# Patient Record
Sex: FEMALE | Race: WHITE | NOT HISPANIC OR LATINO | Employment: FULL TIME | URBAN - METROPOLITAN AREA
[De-identification: names, ages, dates, MRNs, and addresses within clinical notes are randomized per-mention and may not be internally consistent; named-entity substitution may affect disease eponyms.]

---

## 2017-03-08 ENCOUNTER — OFFICE VISIT - HEALTHEAST (OUTPATIENT)
Dept: FAMILY MEDICINE | Facility: CLINIC | Age: 32
End: 2017-03-08

## 2017-03-08 ENCOUNTER — COMMUNICATION - HEALTHEAST (OUTPATIENT)
Dept: TELEHEALTH | Facility: CLINIC | Age: 32
End: 2017-03-08

## 2017-03-08 DIAGNOSIS — Z00.00 ROUTINE GENERAL MEDICAL EXAMINATION AT A HEALTH CARE FACILITY: ICD-10-CM

## 2017-03-08 ASSESSMENT — MIFFLIN-ST. JEOR: SCORE: 1481.44

## 2017-03-13 LAB
HPV INTERPRETATION - HISTORICAL: NORMAL
HPV INTERPRETER - HISTORICAL: NORMAL

## 2017-03-14 ENCOUNTER — COMMUNICATION - HEALTHEAST (OUTPATIENT)
Dept: FAMILY MEDICINE | Facility: CLINIC | Age: 32
End: 2017-03-14

## 2017-03-14 LAB
BKR LAB AP ABNORMAL BLEEDING: NO
BKR LAB AP BIRTH CONTROL/HORMONES: NORMAL
BKR LAB AP CERVICAL APPEARANCE: NORMAL
BKR LAB AP GYN ADEQUACY: NORMAL
BKR LAB AP GYN INTERPRETATION: NORMAL
BKR LAB AP HPV REFLEX: NORMAL
BKR LAB AP LMP: NORMAL
BKR LAB AP PATIENT STATUS: NORMAL
BKR LAB AP PREVIOUS ABNORMAL: NORMAL
BKR LAB AP PREVIOUS NORMAL: 2012
HIGH RISK?: NO
PATH REPORT.COMMENTS IMP SPEC: NORMAL
RESULT FLAG (HE HISTORICAL CONVERSION): NORMAL

## 2017-03-15 ENCOUNTER — COMMUNICATION - HEALTHEAST (OUTPATIENT)
Dept: TELEHEALTH | Facility: CLINIC | Age: 32
End: 2017-03-15

## 2017-03-15 ENCOUNTER — AMBULATORY - HEALTHEAST (OUTPATIENT)
Dept: FAMILY MEDICINE | Facility: CLINIC | Age: 32
End: 2017-03-15

## 2017-03-15 ASSESSMENT — MIFFLIN-ST. JEOR: SCORE: 1499.87

## 2017-04-12 ENCOUNTER — OFFICE VISIT - HEALTHEAST (OUTPATIENT)
Dept: FAMILY MEDICINE | Facility: CLINIC | Age: 32
End: 2017-04-12

## 2017-04-12 DIAGNOSIS — Z30.431 IUD CHECK UP: ICD-10-CM

## 2017-04-12 ASSESSMENT — MIFFLIN-ST. JEOR: SCORE: 1517.45

## 2018-01-07 ENCOUNTER — HEALTH MAINTENANCE LETTER (OUTPATIENT)
Age: 33
End: 2018-01-07

## 2018-01-19 ENCOUNTER — OFFICE VISIT - HEALTHEAST (OUTPATIENT)
Dept: FAMILY MEDICINE | Facility: CLINIC | Age: 33
End: 2018-01-19

## 2018-01-19 DIAGNOSIS — L30.9 DERMATITIS: ICD-10-CM

## 2018-01-19 ASSESSMENT — MIFFLIN-ST. JEOR: SCORE: 1518.3

## 2018-01-25 ENCOUNTER — COMMUNICATION - HEALTHEAST (OUTPATIENT)
Dept: FAMILY MEDICINE | Facility: CLINIC | Age: 33
End: 2018-01-25

## 2018-05-16 ENCOUNTER — OFFICE VISIT - HEALTHEAST (OUTPATIENT)
Dept: FAMILY MEDICINE | Facility: CLINIC | Age: 33
End: 2018-05-16

## 2018-05-16 DIAGNOSIS — L30.9 DERMATITIS: ICD-10-CM

## 2018-05-16 ASSESSMENT — MIFFLIN-ST. JEOR: SCORE: 1475.21

## 2019-05-08 ENCOUNTER — OFFICE VISIT - HEALTHEAST (OUTPATIENT)
Dept: FAMILY MEDICINE | Facility: CLINIC | Age: 34
End: 2019-05-08

## 2019-05-08 DIAGNOSIS — B07.8 COMMON WART: ICD-10-CM

## 2019-05-08 DIAGNOSIS — Z30.9 ENCOUNTER FOR CONTRACEPTIVE MANAGEMENT, UNSPECIFIED TYPE: ICD-10-CM

## 2019-05-08 ASSESSMENT — MIFFLIN-ST. JEOR: SCORE: 1472.37

## 2019-07-10 ENCOUNTER — OFFICE VISIT - HEALTHEAST (OUTPATIENT)
Dept: FAMILY MEDICINE | Facility: CLINIC | Age: 34
End: 2019-07-10

## 2019-07-10 DIAGNOSIS — B07.9 VIRAL WARTS, UNSPECIFIED TYPE: ICD-10-CM

## 2019-07-10 ASSESSMENT — MIFFLIN-ST. JEOR: SCORE: 1468.41

## 2019-08-07 ENCOUNTER — COMMUNICATION - HEALTHEAST (OUTPATIENT)
Dept: FAMILY MEDICINE | Facility: CLINIC | Age: 34
End: 2019-08-07

## 2019-08-28 ENCOUNTER — COMMUNICATION - HEALTHEAST (OUTPATIENT)
Dept: FAMILY MEDICINE | Facility: CLINIC | Age: 34
End: 2019-08-28

## 2019-09-21 ENCOUNTER — HOSPITAL ENCOUNTER (EMERGENCY)
Facility: CLINIC | Age: 34
Discharge: HOME | End: 2019-09-21
Attending: FAMILY MEDICINE | Admitting: FAMILY MEDICINE
Payer: COMMERCIAL

## 2019-09-21 VITALS
TEMPERATURE: 101.4 F | HEART RATE: 103 BPM | BODY MASS INDEX: 24.71 KG/M2 | WEIGHT: 163 LBS | DIASTOLIC BLOOD PRESSURE: 71 MMHG | HEIGHT: 68 IN | SYSTOLIC BLOOD PRESSURE: 118 MMHG | OXYGEN SATURATION: 100 %

## 2019-09-21 DIAGNOSIS — R55 SYNCOPE AND COLLAPSE: ICD-10-CM

## 2019-09-21 LAB
ALBUMIN SERPL-MCNC: 4 G/DL (ref 3.4–5)
ALBUMIN UR-MCNC: NEGATIVE MG/DL
ALP SERPL-CCNC: 54 U/L (ref 40–150)
ALT SERPL W P-5'-P-CCNC: 36 U/L (ref 0–50)
ANION GAP SERPL CALCULATED.3IONS-SCNC: 6 MMOL/L (ref 3–14)
APPEARANCE UR: CLEAR
APTT PPP: 25 SEC (ref 22–37)
AST SERPL W P-5'-P-CCNC: 19 U/L (ref 0–45)
BASOPHILS # BLD AUTO: 0 10E9/L (ref 0–0.2)
BASOPHILS NFR BLD AUTO: 0.3 %
BILIRUB SERPL-MCNC: 0.2 MG/DL (ref 0.2–1.3)
BILIRUB UR QL STRIP: NEGATIVE
BUN SERPL-MCNC: 15 MG/DL (ref 7–30)
CALCIUM SERPL-MCNC: 8.8 MG/DL (ref 8.5–10.1)
CHLORIDE SERPL-SCNC: 101 MMOL/L (ref 94–109)
CO2 SERPL-SCNC: 27 MMOL/L (ref 20–32)
COLOR UR AUTO: YELLOW
CREAT SERPL-MCNC: 0.9 MG/DL (ref 0.52–1.04)
CRP SERPL-MCNC: 5.3 MG/L (ref 0–8)
DEPRECATED S PYO AG THROAT QL EIA: NORMAL
DIFFERENTIAL METHOD BLD: ABNORMAL
EOSINOPHIL # BLD AUTO: 0.1 10E9/L (ref 0–0.7)
EOSINOPHIL NFR BLD AUTO: 0.7 %
ERYTHROCYTE [DISTWIDTH] IN BLOOD BY AUTOMATED COUNT: 10.9 % (ref 10–15)
ETHANOL SERPL-MCNC: <0.01 G/DL
GFR SERPL CREATININE-BSD FRML MDRD: 84 ML/MIN/{1.73_M2}
GLUCOSE SERPL-MCNC: 81 MG/DL (ref 70–99)
GLUCOSE UR STRIP-MCNC: NEGATIVE MG/DL
HCG SERPL QL: NEGATIVE
HCT VFR BLD AUTO: 40.4 % (ref 35–47)
HGB BLD-MCNC: 13.8 G/DL (ref 11.7–15.7)
HGB UR QL STRIP: NEGATIVE
IMM GRANULOCYTES # BLD: 0 10E9/L (ref 0–0.4)
IMM GRANULOCYTES NFR BLD: 0.3 %
INR PPP: 1.04 (ref 0.86–1.14)
KETONES UR STRIP-MCNC: NEGATIVE MG/DL
LACTATE BLD-SCNC: 1.2 MMOL/L (ref 0.7–2)
LEUKOCYTE ESTERASE UR QL STRIP: NEGATIVE
LIPASE SERPL-CCNC: 177 U/L (ref 73–393)
LYMPHOCYTES # BLD AUTO: 0.4 10E9/L (ref 0.8–5.3)
LYMPHOCYTES NFR BLD AUTO: 4.5 %
MCH RBC QN AUTO: 31 PG (ref 26.5–33)
MCHC RBC AUTO-ENTMCNC: 34.2 G/DL (ref 31.5–36.5)
MCV RBC AUTO: 91 FL (ref 78–100)
MONOCYTES # BLD AUTO: 0.7 10E9/L (ref 0–1.3)
MONOCYTES NFR BLD AUTO: 8 %
MUCOUS THREADS #/AREA URNS LPF: PRESENT /LPF
NEUTROPHILS # BLD AUTO: 7.5 10E9/L (ref 1.6–8.3)
NEUTROPHILS NFR BLD AUTO: 86.2 %
NITRATE UR QL: NEGATIVE
NRBC # BLD AUTO: 0 10*3/UL
NRBC BLD AUTO-RTO: 0 /100
PH UR STRIP: 8 PH (ref 5–7)
PLATELET # BLD AUTO: 224 10E9/L (ref 150–450)
POTASSIUM SERPL-SCNC: 3.3 MMOL/L (ref 3.4–5.3)
PROT SERPL-MCNC: 7.6 G/DL (ref 6.8–8.8)
RBC # BLD AUTO: 4.45 10E12/L (ref 3.8–5.2)
RBC #/AREA URNS AUTO: 1 /HPF (ref 0–2)
SODIUM SERPL-SCNC: 134 MMOL/L (ref 133–144)
SOURCE: ABNORMAL
SP GR UR STRIP: 1.01 (ref 1–1.03)
SPECIMEN SOURCE: NORMAL
SQUAMOUS #/AREA URNS AUTO: 3 /HPF (ref 0–1)
TROPONIN I SERPL-MCNC: <0.015 UG/L (ref 0–0.04)
UROBILINOGEN UR STRIP-MCNC: 0 MG/DL (ref 0–2)
WBC # BLD AUTO: 8.7 10E9/L (ref 4–11)
WBC #/AREA URNS AUTO: 1 /HPF (ref 0–5)

## 2019-09-21 PROCEDURE — 85730 THROMBOPLASTIN TIME PARTIAL: CPT | Performed by: FAMILY MEDICINE

## 2019-09-21 PROCEDURE — 93005 ELECTROCARDIOGRAM TRACING: CPT | Performed by: FAMILY MEDICINE

## 2019-09-21 PROCEDURE — 85025 COMPLETE CBC W/AUTO DIFF WBC: CPT | Performed by: FAMILY MEDICINE

## 2019-09-21 PROCEDURE — 83690 ASSAY OF LIPASE: CPT | Performed by: FAMILY MEDICINE

## 2019-09-21 PROCEDURE — 84484 ASSAY OF TROPONIN QUANT: CPT | Performed by: FAMILY MEDICINE

## 2019-09-21 PROCEDURE — 81001 URINALYSIS AUTO W/SCOPE: CPT | Performed by: FAMILY MEDICINE

## 2019-09-21 PROCEDURE — 84703 CHORIONIC GONADOTROPIN ASSAY: CPT | Performed by: FAMILY MEDICINE

## 2019-09-21 PROCEDURE — 83605 ASSAY OF LACTIC ACID: CPT | Performed by: FAMILY MEDICINE

## 2019-09-21 PROCEDURE — 87081 CULTURE SCREEN ONLY: CPT | Performed by: FAMILY MEDICINE

## 2019-09-21 PROCEDURE — 87880 STREP A ASSAY W/OPTIC: CPT | Performed by: FAMILY MEDICINE

## 2019-09-21 PROCEDURE — 93010 ELECTROCARDIOGRAM REPORT: CPT | Mod: Z6 | Performed by: FAMILY MEDICINE

## 2019-09-21 PROCEDURE — 96361 HYDRATE IV INFUSION ADD-ON: CPT | Performed by: FAMILY MEDICINE

## 2019-09-21 PROCEDURE — 99284 EMERGENCY DEPT VISIT MOD MDM: CPT | Mod: 25 | Performed by: FAMILY MEDICINE

## 2019-09-21 PROCEDURE — 96360 HYDRATION IV INFUSION INIT: CPT | Performed by: FAMILY MEDICINE

## 2019-09-21 PROCEDURE — 99285 EMERGENCY DEPT VISIT HI MDM: CPT | Mod: 25 | Performed by: FAMILY MEDICINE

## 2019-09-21 PROCEDURE — 80053 COMPREHEN METABOLIC PANEL: CPT | Performed by: FAMILY MEDICINE

## 2019-09-21 PROCEDURE — 87086 URINE CULTURE/COLONY COUNT: CPT | Performed by: FAMILY MEDICINE

## 2019-09-21 PROCEDURE — 85610 PROTHROMBIN TIME: CPT | Performed by: FAMILY MEDICINE

## 2019-09-21 PROCEDURE — 80320 DRUG SCREEN QUANTALCOHOLS: CPT | Performed by: FAMILY MEDICINE

## 2019-09-21 PROCEDURE — 25800030 ZZH RX IP 258 OP 636: Performed by: FAMILY MEDICINE

## 2019-09-21 PROCEDURE — 86140 C-REACTIVE PROTEIN: CPT | Performed by: FAMILY MEDICINE

## 2019-09-21 RX ORDER — SODIUM CHLORIDE, SODIUM LACTATE, POTASSIUM CHLORIDE, CALCIUM CHLORIDE 600; 310; 30; 20 MG/100ML; MG/100ML; MG/100ML; MG/100ML
1000 INJECTION, SOLUTION INTRAVENOUS CONTINUOUS
Status: DISCONTINUED | OUTPATIENT
Start: 2019-09-21 | End: 2019-09-21 | Stop reason: HOSPADM

## 2019-09-21 RX ADMIN — SODIUM CHLORIDE, POTASSIUM CHLORIDE, SODIUM LACTATE AND CALCIUM CHLORIDE 1000 ML: 600; 310; 30; 20 INJECTION, SOLUTION INTRAVENOUS at 19:40

## 2019-09-21 ASSESSMENT — MIFFLIN-ST. JEOR: SCORE: 1492.86

## 2019-09-21 NOTE — ED AVS SNAPSHOT
[facility] Emergency Department  [address] [facility] [address]  Phone:  [phone]  Fax:  [phone]                                    Emeli Hernandez   MRN: [id number]    Department:  [facility] Emergency Department   Date of Visit:  9/21/2019           After Visit Summary Signature Page    I have received my discharge instructions, and my questions have been answered. I have discussed any challenges I see with this plan with the nurse or doctor.    ..........................................................................................................................................  Patient/Patient Representative Signature      ..........................................................................................................................................  Patient Representative Print Name and Relationship to Patient    ..................................................               ................................................  Date                                   Time    ..........................................................................................................................................  Reviewed by Signature/Title    ...................................................              ..............................................  Date                                               Time          22EPIC Rev 08/18

## 2019-09-21 NOTE — ED NOTES
"Pt states she was sitting after eating pizza and stated \"I think I'm going to pass out\" pt had + witness LOC was lowered to the floor. Pt states this happened once before when she was dx with influenza. Pt had LOC for 30-40 seconds. EMS called to scene and evaluated. Pt states she remembers getting lightheaded and then woke up on the floor.  "

## 2019-09-21 NOTE — ED PROVIDER NOTES
History     Chief Complaint   Patient presents with     Loss of Consciousness     sycopal episode while sitting, eating dinner     HPI  Emeil Hernandez is a 34 year old female, past medical history is unremarkable, presents to the emergency department with concerns of a syncopal event occurring as she was seated at a restaurant after eating dinner.  History is obtained from patient as well as from her significant other who notes that she has not been ill recently, is been eating and drinking normally.  No reason to suspect she may be dehydrated or under nourished.  The patient states that shortly after eating pizza for dinner at a local restaurant just prior to arrival he felt briefly lightheaded and then passed out in a seated position.  Her significant other lowered her to the floor, there was about a 30-42nd loss of consciousness but no observed seizure activity and no bowel or bladder incontinence.  There was no associated headache or visual changes.  Awoke and was somewhat embarrassed and was gradually assisted to a upright position, EMS evaluated the patient at the scene but did not transport her.  Reports feeling essentially back to normal right now.  Patient reports no alcohol this evening however did have a beer, one beer, earlier in the day.  She reports a similar syncopal type episode with the onset of influenza in the past.  She has not had any cough, congestion, runny nose, sore throat, body aches, chills, sweats or fever recently.  Upon recovering from the syncopal event she notes a moderate sore throat and has been exposed to tonsillitis historically in the last week.    Allergies:  No Known Allergies    Problem List:    There are no active problems to display for this patient.       Past Medical History:    No past medical history on file.    Past Surgical History:    No past surgical history on file.    Family History:    No family history on file.    Social History:  Marital Status:   " [2]  Social History     Tobacco Use     Smoking status: Not on file   Substance Use Topics     Alcohol use: Not on file     Drug use: Not on file        Medications:    No current outpatient medications on file.        Review of Systems   All other systems reviewed and are negative.      Physical Exam   BP: 131/74  Pulse: 98  Heart Rate: 104  Temp: 98.5  F (36.9  C)  Height: 172.7 cm (5' 8\")  Weight: 73.9 kg (163 lb)  SpO2: 100 %  Lying Orthostatic BP: 115/60(asymptomatic with position changes. )  Lying Orthostatic Pulse: 102 bpm  Sitting Orthostatic BP: 108/66  Sitting Orthostatic Pulse: 100 bpm  Standing Orthostatic BP: 119/68  Standing Orthostatic Pulse: 101 bpm      Physical Exam  Vitals signs and nursing note reviewed.   Constitutional:       Appearance: She is well-developed and well-nourished.   HENT:      Head: Normocephalic and atraumatic.      Right Ear: External ear normal.      Left Ear: External ear normal.      Nose: Nose normal.        Comments: Oropharynx slightly red but without exudateEyes:      Extraocular Movements: EOM normal.      Conjunctiva/sclera: Conjunctivae normal.      Pupils: Pupils are equal, round, and reactive to light.   Neck:      Musculoskeletal: Normal range of motion and neck supple.   Cardiovascular:      Rate and Rhythm: Normal rate and regular rhythm.      Pulses: Intact distal pulses.      Heart sounds: Normal heart sounds.   Pulmonary:      Effort: Pulmonary effort is normal.      Breath sounds: Normal breath sounds.   Abdominal:      General: Bowel sounds are normal.      Palpations: Abdomen is soft.   Musculoskeletal: Normal range of motion.   Skin:     General: Skin is warm and dry.      Capillary Refill: Capillary refill takes less than 2 seconds.   Neurological:      Mental Status: She is alert and oriented to person, place, and time.   Psychiatric:         Mood and Affect: Mood and affect normal.         Behavior: Behavior normal.         ED Course      "   Procedures               EKG Interpretation:      Interpreted by Stefan Leone MD  Time reviewed: 1847 sinus rhythm 95 bpm nonspecific QRS widening.  Normal axis, normal intervals.  No acute ST-T wave changes.  No prior EKG for comparison      Labs Ordered and Resulted from Time of ED Arrival Up to the Time of Departure from the ED   CBC WITH PLATELETS DIFFERENTIAL - Abnormal; Notable for the following components:       Result Value    Absolute Lymphocytes 0.4 (*)     All other components within normal limits   COMPREHENSIVE METABOLIC PANEL - Abnormal; Notable for the following components:    Potassium 3.3 (*)     All other components within normal limits   ROUTINE UA WITH MICROSCOPIC REFLEX TO CULTURE - Abnormal; Notable for the following components:    pH Urine 8.0 (*)     Squamous Epithelial /HPF Urine 3 (*)     Mucous Urine Present (*)     All other components within normal limits   CRP INFLAMMATION   INR   PARTIAL THROMBOPLASTIN TIME   LIPASE   LACTIC ACID WHOLE BLOOD   TROPONIN I   HCG QUALITATIVE   ALCOHOL ETHYL   RAPID STREP SCREEN             Critical Care time:  none               No results found for this or any previous visit (from the past 24 hour(s)).    Medications   lactated ringers BOLUS 1,000 mL (0 mLs Intravenous Stopped 9/21/19 2117)       Assessments & Plan (with Medical Decision Making)   34-year-old female past medical history reviewed as above who presents to the emergency department with concerns of a syncope/fainting episode shortly after eating dinner the evening of the day of presentation.  Physical exam finds her alert and in no acute distress vitals on presentation are stable and within normal adult limits.  Nonfocal exam, nonlateralizing neurologic exam.  Mild oropharyngeal erythema but no exudate.  Differential diagnostic considerations were reviewed with the patient and her significant other before proceeding with work-up.  The patient does not feel that she has been under  any undue stress recently.  No recent trauma or injury.  No new medications, she did not drink an excess amount of alcohol recently.  No reason to suspect that she should be dehydrated and her orthostatic vital signs would not reflect volume depletion.  Normal physiologic responses.  Her EKG demonstrated some sinus rhythm with nonspecific QRS widening but no acute ST-T wave changes.  Lab diagnostics showed a slightly low potassium of 3.3 but would not be a suitable explanation for her episode today this is only mildly abnormal.  Remainder of lab diagnostics including CRP, lipase, lactic acid, cardiac troponin, hCG, alcohol, and a rapid strep screen are all unremarkable.  Patient does note in the  past that at the start of influenza she had an episode like this however she has really no symptoms or physical findings would be consistent with influenza at this point.  She has mild oropharyngeal erythema and really no other abnormalities.  This may represent the start of an early URI or viral syndrome but at this point it is too early to really tell what the real process in play may be.  On her evaluation today I find no concerning findings to warrant further evaluation at this point.  Historically there are no red flags to indicate need for further work-up at this point.  I think she needs to allow this some time and as she develops potentially additional symptoms more clarity to the ultimate diagnosis would be possible.  The patient remained stable in the emergency department throughout the course of her visit, developed no additional symptoms and experienced no further episodes.  She was able to eat and drink.  She will be dispositioned home to return if she develops any additional symptoms of further concerns.      Disclaimer: This note consists of symbols derived from keyboarding, dictation and/or voice recognition software. As a result, there may be errors in the script that have gone undetected. Please consider  this when interpreting information found in this chart.      I have reviewed the nursing notes.    I have reviewed the findings, diagnosis, plan and need for follow up with the patient.       There are no discharge medications for this patient.      Final diagnoses:   Syncope and collapse - Etiology unclear, possible early URI precipitating       9/21/2019   [facility] EMERGENCY DEPARTMENT     Stefan Leone MD  09/26/19 0814

## 2019-09-22 LAB
BACTERIA SPEC CULT: NORMAL
Lab: NORMAL
SPECIMEN SOURCE: NORMAL

## 2019-09-22 NOTE — DISCHARGE INSTRUCTIONS
Push fluids, rest.  Tylenol/ibuprofen as needed for sore throat.  Return to the emergency department if worse or changes.  Recurrent episodes should be followed up in the emergency department and consider possibility of outpatient clinic evaluation.      Possible Causes of Dizziness or Fainting    Dizziness and fainting can have many causes. Below are some examples of possible causes your healthcare provider will look to rule out.  Benign paroxysmal positional vertigo (BPPV)  BPPV results when calcium crystals inside the inner ear shift into the wrong position. BPPV causes episodes of vertigo, a spinning sensation. Episodes most often happen when the head is moved in a certain way. This is more common in people age 65 and older.   Infection or inflammation  The semicircular canals of the ear may become infected or inflamed. In this case, they can send the wrong balance signals. This can cause vertigo.  Meniere disease  Meniere disease happens when there is too much fluid in the semicircular canals. This can cause vertigo. It also can cause hearing problems and buzzing or ringing in the ears (called tinnitus). You may also have a feeling of pressure or fullness in the ear.  Syncope  Syncope is fainting that happens when the brain doesn t get enough oxygen-rich blood. It can be caused by low heart rate or low blood pressure. This is called vasovagal syncope. It can also be caused by sitting or standing up too quickly. This is called orthostatic hypotension. Syncope may also be due to a heart valve problem, an abnormal heart rhythm, or other heart problems. Dizziness can also happen from stroke, hemorrhage in the brain, or other problems in the brain. Your healthcare provider may do certain tests to rule out these conditions.  Other causes  Other causes include:    Medicines. Certain medicines can cause dizziness and even fainting. In some cases, stopping a medicine too quickly can lead to withdrawal symptoms,  including dizziness and fainting.    Anxiety. Being anxious can lead to breathing changes, such as hyperventilation. These can lead to dizziness and fainting.  Additional causes for dizziness and fainting also exist. Talk with your healthcare provider for more information.     Date Last Reviewed: 5/1/2018 2000-2018 [employer]. [address] [place] [address], [place] [address]. All rights reserved. This information is not intended as a substitute for professional medical care. Always follow your healthcare professional's instructions.          Causes of Syncope  Syncope (fainting) has many causes. Sometimes it is not serious. In other cases, syncope is a sign of a heart problem. But treatment can help    When syncope is not serious  Your healthcare provider may call your problem vasovagal syncope, reflex syncope, or orthostatic hypotension. These types of syncope are generally not serious. They can be caused by:    Strong feelings, such as anxiety or fear. A nerve signal may briefly change your heart rate and lower your blood pressure too much.    Standing for too long. Standing may cause blood to pool in your legs. When this happens, your brain may not receive all the blood it needs.    Standing up too quickly. Your blood pressure may not adjust fast enough to changes in posture and may drop too low. Certain medicines can also cause this problem. Examples of medicines that can cause a drop in blood pressure include diuretics, blood pressure medicines, and medicines for chest pain. Your pharmacist or healthcare provider can discuss these with you.    Reaction to normal body functions. When you go to the bathroom, have gastrointestinal discomfort, nausea, or pain, your heart may have a natural reflex to slow down and lower blood pressure. This can result in syncope. This may also follow exercise, eating, laughter, weight lifting, or playing musical instruments like the trumpet or trombone.  When heart  trouble causes syncope  A heart problem can decrease the amount of oxygen-rich blood that reaches the brain. Heart trouble can be serious and even life threatening if not treated:    A slow heart rate. Electrical signals tell the chambers of the heart when to pump. But the signals may be slowed or blocked (heart block) as they travel on the heart s electrical pathways. This can be caused by aging, scarred heart tissue, or damage from heart disease. When the heart rate slows, not enough blood is pumped.    A fast heart rate. Certain problems can make the heart race. For instance, after a heart attack, also known as acute myocardial infarction, or AMI, abnormal electrical signals may be created. These signals can make the heart suddenly beat very fast. The heart pumps before the chambers can fill with blood. So less blood reaches the brain and other parts of the body. Illegal drugs, certain medicines, heart disease, or an inherited condition can also cause this.    A heart valve problem. Blood travels through the chambers of the heart as it is pumped. Heart valves open and close to help move blood in the right direction. But a valve may not open or close fully, if it s hardened or scarred. As a result, less blood is pumped through the heart to the brain and body. Most often, syncope occurs when a person's aortic valve is critically narrowed and he or she participates in  a strenuous activity.    A heart muscle problem. Some people develop a thickened heart muscle that blocks blood flow out of the heart to the body. This is called hypertrophic cardiomyopathy. Being dehydrated and having hypertrophic cardiomyopathy can increase the risk for syncope.  Whatever the cause of syncope, it is important to be evaluated by your healthcare provider. You may need to be seen by a cardiologist, neurologist, or an ear, nose, and throat specialist. Do not drive, operate heavy machinery, or participate in activities in which you would  be at risk for falls and injury if you have syncope and have not been evaluated.  Date Last Reviewed: 5/1/2016 2000-2018 [employer]. [address] [place] [address], [place] [address]. All rights reserved. This information is not intended as a substitute for professional medical care. Always follow your healthcare professional's instructions.

## 2019-09-23 LAB
BACTERIA SPEC CULT: NORMAL
SPECIMEN SOURCE: NORMAL

## 2019-09-23 NOTE — RESULT ENCOUNTER NOTE
Final urine culture report is NEGATIVE per [facility] Lab Result protocol.    If NEGATIVE result, no change in treatment, per [facility] Lab Result protocol.

## 2019-09-23 NOTE — RESULT ENCOUNTER NOTE
Final Beta strep group A r/o culture is NEGATIVE for Group A streptococcus.    No treatment or change in treatment per [facility] Strep protocol.

## 2020-01-06 ENCOUNTER — MEDICAL CORRESPONDENCE (OUTPATIENT)
Dept: HEALTH INFORMATION MANAGEMENT | Facility: CLINIC | Age: 35
End: 2020-01-06

## 2020-03-10 ENCOUNTER — HEALTH MAINTENANCE LETTER (OUTPATIENT)
Age: 35
End: 2020-03-10

## 2020-03-13 ENCOUNTER — COMMUNICATION - HEALTHEAST (OUTPATIENT)
Dept: FAMILY MEDICINE | Facility: CLINIC | Age: 35
End: 2020-03-13

## 2020-03-13 DIAGNOSIS — B07.8 OTHER VIRAL WARTS: ICD-10-CM

## 2020-03-17 ENCOUNTER — COMMUNICATION - HEALTHEAST (OUTPATIENT)
Dept: FAMILY MEDICINE | Facility: CLINIC | Age: 35
End: 2020-03-17

## 2020-03-20 ENCOUNTER — COMMUNICATION - HEALTHEAST (OUTPATIENT)
Dept: FAMILY MEDICINE | Facility: CLINIC | Age: 35
End: 2020-03-20

## 2020-03-25 ENCOUNTER — OFFICE VISIT - HEALTHEAST (OUTPATIENT)
Dept: FAMILY MEDICINE | Facility: CLINIC | Age: 35
End: 2020-03-25

## 2020-03-25 DIAGNOSIS — N91.2 AMENORRHEA: ICD-10-CM

## 2020-04-01 ENCOUNTER — COMMUNICATION - HEALTHEAST (OUTPATIENT)
Dept: FAMILY MEDICINE | Facility: CLINIC | Age: 35
End: 2020-04-01

## 2020-04-01 ENCOUNTER — RECORDS - HEALTHEAST (OUTPATIENT)
Dept: ADMINISTRATIVE | Facility: OTHER | Age: 35
End: 2020-04-01

## 2020-04-03 ENCOUNTER — COMMUNICATION - HEALTHEAST (OUTPATIENT)
Dept: FAMILY MEDICINE | Facility: CLINIC | Age: 35
End: 2020-04-03

## 2020-05-01 ENCOUNTER — PRENATAL OFFICE VISIT - HEALTHEAST (OUTPATIENT)
Dept: FAMILY MEDICINE | Facility: CLINIC | Age: 35
End: 2020-05-01

## 2020-05-01 DIAGNOSIS — Z34.02 PRIMIGRAVIDA IN SECOND TRIMESTER: ICD-10-CM

## 2020-05-01 LAB
BASOPHILS # BLD AUTO: 0 THOU/UL (ref 0–0.2)
BASOPHILS NFR BLD AUTO: 1 % (ref 0–2)
EOSINOPHIL # BLD AUTO: 0.1 THOU/UL (ref 0–0.4)
EOSINOPHIL NFR BLD AUTO: 1 % (ref 0–6)
ERYTHROCYTE [DISTWIDTH] IN BLOOD BY AUTOMATED COUNT: 11.6 % (ref 11–14.5)
HCT VFR BLD AUTO: 40.8 % (ref 35–47)
HGB BLD-MCNC: 13.3 G/DL (ref 12–16)
HIV 1+2 AB+HIV1 P24 AG SERPL QL IA: NEGATIVE
LYMPHOCYTES # BLD AUTO: 1.4 THOU/UL (ref 0.8–4.4)
LYMPHOCYTES NFR BLD AUTO: 23 % (ref 20–40)
MCH RBC QN AUTO: 31.8 PG (ref 27–34)
MCHC RBC AUTO-ENTMCNC: 32.6 G/DL (ref 32–36)
MCV RBC AUTO: 98 FL (ref 80–100)
MONOCYTES # BLD AUTO: 0.4 THOU/UL (ref 0–0.9)
MONOCYTES NFR BLD AUTO: 6 % (ref 2–10)
NEUTROPHILS # BLD AUTO: 4.1 THOU/UL (ref 2–7.7)
NEUTROPHILS NFR BLD AUTO: 69 % (ref 50–70)
PLATELET # BLD AUTO: 262 THOU/UL (ref 140–440)
PMV BLD AUTO: 9.3 FL (ref 8.5–12.5)
RBC # BLD AUTO: 4.18 MILL/UL (ref 3.8–5.4)
WBC: 5.9 THOU/UL (ref 4–11)

## 2020-05-01 ASSESSMENT — MIFFLIN-ST. JEOR: SCORE: 1466.09

## 2020-05-02 LAB — ANTIBODY SCREEN: NEGATIVE

## 2020-05-04 LAB
ABO/RH(D): NORMAL
ABORH REPEAT: NORMAL
HBV SURFACE AG SERPL QL IA: NEGATIVE
HPV SOURCE: ABNORMAL
HUMAN PAPILLOMA VIRUS 16 DNA: NEGATIVE
HUMAN PAPILLOMA VIRUS 18 DNA: NEGATIVE
HUMAN PAPILLOMA VIRUS FINAL DIAGNOSIS: ABNORMAL
HUMAN PAPILLOMA VIRUS OTHER HR: POSITIVE
SPECIMEN DESCRIPTION: ABNORMAL
T PALLIDUM AB SER QL: NEGATIVE

## 2020-05-05 ENCOUNTER — COMMUNICATION - HEALTHEAST (OUTPATIENT)
Dept: FAMILY MEDICINE | Facility: CLINIC | Age: 35
End: 2020-05-05

## 2020-05-05 LAB
C TRACH DNA SPEC QL PROBE+SIG AMP: NEGATIVE
N GONORRHOEA DNA SPEC QL NAA+PROBE: NEGATIVE
RUBV IGG SERPL QL IA: NORMAL

## 2020-05-08 LAB

## 2020-05-29 ENCOUNTER — OFFICE VISIT - HEALTHEAST (OUTPATIENT)
Dept: FAMILY MEDICINE | Facility: CLINIC | Age: 35
End: 2020-05-29

## 2020-05-29 DIAGNOSIS — Z34.02 ENCOUNTER FOR SUPERVISION OF NORMAL FIRST PREGNANCY IN SECOND TRIMESTER: ICD-10-CM

## 2020-06-08 ENCOUNTER — RECORDS - HEALTHEAST (OUTPATIENT)
Dept: ADMINISTRATIVE | Facility: OTHER | Age: 35
End: 2020-06-08

## 2020-06-19 ENCOUNTER — PRENATAL OFFICE VISIT - HEALTHEAST (OUTPATIENT)
Dept: FAMILY MEDICINE | Facility: CLINIC | Age: 35
End: 2020-06-19

## 2020-06-19 DIAGNOSIS — Z34.02 ENCOUNTER FOR SUPERVISION OF NORMAL FIRST PREGNANCY IN SECOND TRIMESTER: ICD-10-CM

## 2020-06-25 ENCOUNTER — COMMUNICATION - HEALTHEAST (OUTPATIENT)
Dept: FAMILY MEDICINE | Facility: CLINIC | Age: 35
End: 2020-06-25

## 2020-06-25 ENCOUNTER — RECORDS - HEALTHEAST (OUTPATIENT)
Dept: ADMINISTRATIVE | Facility: OTHER | Age: 35
End: 2020-06-25

## 2020-06-26 ENCOUNTER — COMMUNICATION - HEALTHEAST (OUTPATIENT)
Dept: FAMILY MEDICINE | Facility: CLINIC | Age: 35
End: 2020-06-26

## 2020-07-21 ENCOUNTER — PRENATAL OFFICE VISIT - HEALTHEAST (OUTPATIENT)
Dept: FAMILY MEDICINE | Facility: CLINIC | Age: 35
End: 2020-07-21

## 2020-07-21 DIAGNOSIS — Z34.02 ENCOUNTER FOR SUPERVISION OF NORMAL FIRST PREGNANCY IN SECOND TRIMESTER: ICD-10-CM

## 2020-07-29 ENCOUNTER — RECORDS - HEALTHEAST (OUTPATIENT)
Dept: ADMINISTRATIVE | Facility: OTHER | Age: 35
End: 2020-07-29

## 2020-08-19 ENCOUNTER — PRENATAL OFFICE VISIT - HEALTHEAST (OUTPATIENT)
Dept: FAMILY MEDICINE | Facility: CLINIC | Age: 35
End: 2020-08-19

## 2020-08-19 ENCOUNTER — COMMUNICATION - HEALTHEAST (OUTPATIENT)
Dept: FAMILY MEDICINE | Facility: CLINIC | Age: 35
End: 2020-08-19

## 2020-08-19 DIAGNOSIS — Z34.02 ENCOUNTER FOR SUPERVISION OF NORMAL FIRST PREGNANCY IN SECOND TRIMESTER: ICD-10-CM

## 2020-08-19 LAB
FASTING STATUS PATIENT QL REPORTED: NORMAL
GLUCOSE 1H P 50 G GLC PO SERPL-MCNC: 99 MG/DL (ref 70–139)
HGB BLD-MCNC: 12.8 G/DL (ref 12–16)

## 2020-08-20 LAB — T PALLIDUM AB SER QL: NEGATIVE

## 2020-09-02 ENCOUNTER — PRENATAL OFFICE VISIT - HEALTHEAST (OUTPATIENT)
Dept: FAMILY MEDICINE | Facility: CLINIC | Age: 35
End: 2020-09-02

## 2020-09-02 DIAGNOSIS — Z34.03 ENCOUNTER FOR SUPERVISION OF NORMAL FIRST PREGNANCY IN THIRD TRIMESTER: ICD-10-CM

## 2020-09-04 ENCOUNTER — COMMUNICATION - HEALTHEAST (OUTPATIENT)
Dept: FAMILY MEDICINE | Facility: CLINIC | Age: 35
End: 2020-09-04

## 2020-09-04 ENCOUNTER — RECORDS - HEALTHEAST (OUTPATIENT)
Dept: ADMINISTRATIVE | Facility: OTHER | Age: 35
End: 2020-09-04

## 2020-09-16 ENCOUNTER — PRENATAL OFFICE VISIT - HEALTHEAST (OUTPATIENT)
Dept: FAMILY MEDICINE | Facility: CLINIC | Age: 35
End: 2020-09-16

## 2020-09-16 DIAGNOSIS — Z34.03 ENCOUNTER FOR SUPERVISION OF NORMAL FIRST PREGNANCY IN THIRD TRIMESTER: ICD-10-CM

## 2020-09-30 ENCOUNTER — COMMUNICATION - HEALTHEAST (OUTPATIENT)
Dept: FAMILY MEDICINE | Facility: CLINIC | Age: 35
End: 2020-09-30

## 2020-09-30 ENCOUNTER — PRENATAL OFFICE VISIT - HEALTHEAST (OUTPATIENT)
Dept: FAMILY MEDICINE | Facility: CLINIC | Age: 35
End: 2020-09-30

## 2020-09-30 DIAGNOSIS — Z34.03 ENCOUNTER FOR SUPERVISION OF NORMAL FIRST PREGNANCY IN THIRD TRIMESTER: ICD-10-CM

## 2020-10-06 ENCOUNTER — COMMUNICATION - HEALTHEAST (OUTPATIENT)
Dept: FAMILY MEDICINE | Facility: CLINIC | Age: 35
End: 2020-10-06

## 2020-10-16 ENCOUNTER — PRENATAL OFFICE VISIT - HEALTHEAST (OUTPATIENT)
Dept: FAMILY MEDICINE | Facility: CLINIC | Age: 35
End: 2020-10-16

## 2020-10-16 DIAGNOSIS — Z34.03 ENCOUNTER FOR SUPERVISION OF NORMAL FIRST PREGNANCY IN THIRD TRIMESTER: ICD-10-CM

## 2020-10-16 LAB
ALBUMIN UR-MCNC: NEGATIVE MG/DL
GLUCOSE UR STRIP-MCNC: NEGATIVE MG/DL
KETONES UR STRIP-MCNC: NEGATIVE MG/DL

## 2020-10-17 LAB
ALLERGIC TO PENICILLIN: NO
GP B STREP DNA SPEC QL NAA+PROBE: NEGATIVE

## 2020-10-23 ENCOUNTER — PRENATAL OFFICE VISIT - HEALTHEAST (OUTPATIENT)
Dept: FAMILY MEDICINE | Facility: CLINIC | Age: 35
End: 2020-10-23

## 2020-10-23 ENCOUNTER — COMMUNICATION - HEALTHEAST (OUTPATIENT)
Dept: FAMILY MEDICINE | Facility: CLINIC | Age: 35
End: 2020-10-23

## 2020-10-23 DIAGNOSIS — Z34.03 ENCOUNTER FOR SUPERVISION OF NORMAL FIRST PREGNANCY IN THIRD TRIMESTER: ICD-10-CM

## 2020-10-23 LAB
ALBUMIN SERPL-MCNC: 2.9 G/DL (ref 3.5–5)
ALBUMIN UR-MCNC: NEGATIVE MG/DL
ALP SERPL-CCNC: 141 U/L (ref 45–120)
ALT SERPL W P-5'-P-CCNC: 17 U/L (ref 0–45)
ANION GAP SERPL CALCULATED.3IONS-SCNC: 12 MMOL/L (ref 5–18)
AST SERPL W P-5'-P-CCNC: 22 U/L (ref 0–40)
BILIRUB SERPL-MCNC: 0.2 MG/DL (ref 0–1)
BUN SERPL-MCNC: 16 MG/DL (ref 8–22)
CALCIUM SERPL-MCNC: 8.9 MG/DL (ref 8.5–10.5)
CHLORIDE BLD-SCNC: 107 MMOL/L (ref 98–107)
CO2 SERPL-SCNC: 19 MMOL/L (ref 22–31)
CREAT SERPL-MCNC: 0.72 MG/DL (ref 0.6–1.1)
ERYTHROCYTE [DISTWIDTH] IN BLOOD BY AUTOMATED COUNT: 11.8 % (ref 11–14.5)
GFR SERPL CREATININE-BSD FRML MDRD: >60 ML/MIN/1.73M2
GLUCOSE BLD-MCNC: 118 MG/DL (ref 70–125)
GLUCOSE UR STRIP-MCNC: NEGATIVE MG/DL
HCT VFR BLD AUTO: 40.3 % (ref 35–47)
HGB BLD-MCNC: 13.5 G/DL (ref 12–16)
KETONES UR STRIP-MCNC: ABNORMAL MG/DL
MCH RBC QN AUTO: 31.3 PG (ref 27–34)
MCHC RBC AUTO-ENTMCNC: 33.5 G/DL (ref 32–36)
MCV RBC AUTO: 93 FL (ref 80–100)
PLATELET # BLD AUTO: 223 THOU/UL (ref 140–440)
PMV BLD AUTO: 8.1 FL (ref 7–10)
POTASSIUM BLD-SCNC: 4.1 MMOL/L (ref 3.5–5)
PROT SERPL-MCNC: 5.8 G/DL (ref 6–8)
RBC # BLD AUTO: 4.31 MILL/UL (ref 3.8–5.4)
SODIUM SERPL-SCNC: 138 MMOL/L (ref 136–145)
WBC: 9.4 THOU/UL (ref 4–11)

## 2020-10-26 ENCOUNTER — PRENATAL OFFICE VISIT - HEALTHEAST (OUTPATIENT)
Dept: FAMILY MEDICINE | Facility: CLINIC | Age: 35
End: 2020-10-26

## 2020-10-26 DIAGNOSIS — Z34.03 ENCOUNTER FOR SUPERVISION OF NORMAL FIRST PREGNANCY IN THIRD TRIMESTER: ICD-10-CM

## 2020-10-30 ENCOUNTER — PRENATAL OFFICE VISIT - HEALTHEAST (OUTPATIENT)
Dept: FAMILY MEDICINE | Facility: CLINIC | Age: 35
End: 2020-10-30

## 2020-10-30 DIAGNOSIS — Z34.03 ENCOUNTER FOR SUPERVISION OF NORMAL FIRST PREGNANCY IN THIRD TRIMESTER: ICD-10-CM

## 2020-11-04 ENCOUNTER — COMMUNICATION - HEALTHEAST (OUTPATIENT)
Dept: FAMILY MEDICINE | Facility: CLINIC | Age: 35
End: 2020-11-04

## 2020-11-06 ENCOUNTER — PRENATAL OFFICE VISIT - HEALTHEAST (OUTPATIENT)
Dept: FAMILY MEDICINE | Facility: CLINIC | Age: 35
End: 2020-11-06

## 2020-11-06 DIAGNOSIS — Z34.03 ENCOUNTER FOR SUPERVISION OF NORMAL FIRST PREGNANCY IN THIRD TRIMESTER: ICD-10-CM

## 2020-11-11 ENCOUNTER — HOSPITAL ENCOUNTER (OUTPATIENT)
Dept: OBGYN | Facility: HOSPITAL | Age: 35
Discharge: HOME | End: 2020-11-11
Attending: FAMILY MEDICINE | Admitting: FAMILY MEDICINE

## 2020-11-11 ENCOUNTER — PRENATAL OFFICE VISIT - HEALTHEAST (OUTPATIENT)
Dept: FAMILY MEDICINE | Facility: CLINIC | Age: 35
End: 2020-11-11

## 2020-11-11 DIAGNOSIS — O09.93 HIGH-RISK PREGNANCY, THIRD TRIMESTER: ICD-10-CM

## 2020-11-11 DIAGNOSIS — O13.3 GESTATIONAL HYPERTENSION, THIRD TRIMESTER: ICD-10-CM

## 2020-11-11 LAB
ALBUMIN SERPL-MCNC: 3 G/DL (ref 3.5–5)
ALBUMIN UR-MCNC: NEGATIVE MG/DL
ALP SERPL-CCNC: 156 U/L (ref 45–120)
ALT SERPL W P-5'-P-CCNC: 16 U/L (ref 0–45)
ANION GAP SERPL CALCULATED.3IONS-SCNC: 12 MMOL/L (ref 5–18)
AST SERPL W P-5'-P-CCNC: 23 U/L (ref 0–40)
BILIRUB SERPL-MCNC: 0.2 MG/DL (ref 0–1)
BUN SERPL-MCNC: 15 MG/DL (ref 8–22)
CALCIUM SERPL-MCNC: 9.1 MG/DL (ref 8.5–10.5)
CHLORIDE BLD-SCNC: 106 MMOL/L (ref 98–107)
CO2 SERPL-SCNC: 17 MMOL/L (ref 22–31)
CREAT SERPL-MCNC: 0.78 MG/DL (ref 0.6–1.1)
ERYTHROCYTE [DISTWIDTH] IN BLOOD BY AUTOMATED COUNT: 12 % (ref 11–14.5)
GFR SERPL CREATININE-BSD FRML MDRD: >60 ML/MIN/1.73M2
GLUCOSE BLD-MCNC: 78 MG/DL (ref 70–125)
GLUCOSE UR STRIP-MCNC: NEGATIVE MG/DL
HCT VFR BLD AUTO: 41.1 % (ref 35–47)
HGB BLD-MCNC: 13.9 G/DL (ref 12–16)
KETONES UR STRIP-MCNC: NEGATIVE MG/DL
MCH RBC QN AUTO: 31.8 PG (ref 27–34)
MCHC RBC AUTO-ENTMCNC: 33.8 G/DL (ref 32–36)
MCV RBC AUTO: 94 FL (ref 80–100)
PLATELET # BLD AUTO: 192 THOU/UL (ref 140–440)
PMV BLD AUTO: 8.1 FL (ref 7–10)
POTASSIUM BLD-SCNC: 4.3 MMOL/L (ref 3.5–5)
PROT SERPL-MCNC: 5.8 G/DL (ref 6–8)
RBC # BLD AUTO: 4.36 MILL/UL (ref 3.8–5.4)
SODIUM SERPL-SCNC: 135 MMOL/L (ref 136–145)
WBC: 6.2 THOU/UL (ref 4–11)

## 2020-11-12 ENCOUNTER — COMMUNICATION - HEALTHEAST (OUTPATIENT)
Dept: SCHEDULING | Facility: CLINIC | Age: 35
End: 2020-11-12

## 2020-11-12 LAB
SARS-COV-2 PCR COMMENT: NORMAL
SARS-COV-2 RNA SPEC QL NAA+PROBE: NEGATIVE
SARS-COV-2 VIRUS SPECIMEN SOURCE: NORMAL

## 2020-11-13 ENCOUNTER — ANESTHESIA - HEALTHEAST (OUTPATIENT)
Dept: OBGYN | Facility: HOSPITAL | Age: 35
End: 2020-11-13

## 2020-11-13 ENCOUNTER — HOSPITAL ENCOUNTER (OUTPATIENT)
Dept: OBGYN | Facility: HOSPITAL | Age: 35
Discharge: HOME | End: 2020-11-13
Attending: FAMILY MEDICINE | Admitting: FAMILY MEDICINE

## 2020-11-13 LAB — RUPTURE OF FETAL MEMBRANES BY ROM PLUS: NEGATIVE

## 2020-11-13 ASSESSMENT — MIFFLIN-ST. JEOR: SCORE: 1768.63

## 2020-11-15 ENCOUNTER — HOME CARE/HOSPICE - HEALTHEAST (OUTPATIENT)
Dept: HOME HEALTH SERVICES | Facility: HOME HEALTH | Age: 35
End: 2020-11-15

## 2020-11-18 ENCOUNTER — HOME CARE/HOSPICE - HEALTHEAST (OUTPATIENT)
Dept: HOME HEALTH SERVICES | Facility: HOME HEALTH | Age: 35
End: 2020-11-18

## 2020-11-20 ENCOUNTER — AMBULATORY - HEALTHEAST (OUTPATIENT)
Dept: PEDIATRICS | Facility: CLINIC | Age: 35
End: 2020-11-20

## 2020-11-23 ENCOUNTER — AMBULATORY - HEALTHEAST (OUTPATIENT)
Dept: PEDIATRICS | Facility: CLINIC | Age: 35
End: 2020-11-23

## 2020-11-30 ENCOUNTER — AMBULATORY - HEALTHEAST (OUTPATIENT)
Dept: PEDIATRICS | Facility: CLINIC | Age: 35
End: 2020-11-30

## 2020-12-01 ENCOUNTER — COMMUNICATION - HEALTHEAST (OUTPATIENT)
Dept: FAMILY MEDICINE | Facility: CLINIC | Age: 35
End: 2020-12-01

## 2020-12-09 ENCOUNTER — MEDICAL CORRESPONDENCE (OUTPATIENT)
Dept: HEALTH INFORMATION MANAGEMENT | Facility: CLINIC | Age: 35
End: 2020-12-09

## 2020-12-27 ENCOUNTER — HEALTH MAINTENANCE LETTER (OUTPATIENT)
Age: 35
End: 2020-12-27

## 2021-01-06 ENCOUNTER — PRENATAL OFFICE VISIT (OUTPATIENT)
Dept: FAMILY MEDICINE | Facility: CLINIC | Age: 36
End: 2021-01-06
Payer: COMMERCIAL

## 2021-01-06 VITALS
SYSTOLIC BLOOD PRESSURE: 120 MMHG | DIASTOLIC BLOOD PRESSURE: 72 MMHG | HEIGHT: 67 IN | HEART RATE: 76 BPM | RESPIRATION RATE: 16 BRPM | BODY MASS INDEX: 25.53 KG/M2 | TEMPERATURE: 98.2 F

## 2021-01-06 PROCEDURE — 99207 PR POST PARTUM EXAM: CPT | Performed by: FAMILY MEDICINE

## 2021-01-07 NOTE — PROGRESS NOTES
"Assessment:      Emeli Powell is a 35 year old female here for postpartum exam at 6 weeks postpartum.     1. Routine postpartum follow-up      Plan:     [unfilled]  1. Routine f/u - healing well  2. Contraception: will plan on Mirana IUD    Subjective:      Emeli Powell is a 35 year old female who presents for a postpartum visit. She is 6 weeks postpartum following a vaginal delivery.  I have fully reviewed the prenatal and intrapartum course. The delivery was at 40 gestational weeks. Outcome: vaginal delivery. Postpartum course has been uncomplicated. Baby's course has been uncomplicated. Baby is feeding by breast.. Bled for 4 weeks postpartum.  She is currently experiencing no vaginal bleeding. Bowel function is normal. Bladder function is normal. Patient has not resumed intercourse. Contraception method is planned IUD. Postpartum depression screening score: 5    The following portions of the patient's history were reviewed and updated as appropriate: all    Review of Systems  General:  Denies problem  Eyes: Denies problem  Ears/Nose/Throat: Denies problem  Cardiovascular: Denies problem  Respiratory:  Denies problem  Gastrointestinal:  Denies problem, Genitourinary: Denies problem  Musculoskeletal:  Denies problem  Skin: Denies problem  Neurologic: Denies problem  Psychiatric: Denies problem  Endocrine: Denies problem  Heme/Lymphatic: Denies problem   Allergic/Immunologic: Denies problem          Objective:      Vitals:    01/06/21 1035   BP: 120/72   Pulse: 76   Resp: 16   Temp: 98.2  F (36.8  C)   TempSrc: Tympanic   Height: 1.702 m (5' 7\")         Physical Exam:  General Appearance: Alert, cooperative, no distress, appears stated age  Head: Normocephalic, without obvious abnormality, atraumatic  Eyes: PERRL, conjunctiva/corneas clear, EOM's intact  Throat: Lips, mucosa, and tongue normal  Neck: Supple, symmetrical, trachea midline, no adenopathy;  thyroid: not enlarged   Lungs: Clear to " auscultation bilaterally, respirations unlabored  Breasts: No breast masses, tenderness, asymmetry, or nipple discharge.  Heart: Regular rate and rhythm, S1 and S2 normal   Abdomen: Soft, non-tender, uterus firm  Pelvic:Normally developed genitalia with no external lesions or eruptions. Vagina shows no lesions, well healing laceration, no inflammation, discharge or tenderness. No cystocele, No rectocele. Uterus firm and below umbilicus.  No adnexal mass or tenderness.  Extremities: Extremities normal, atraumatic, no cyanosis or edema  Skin: Skin color, texture, turgor normal, no rashes or lesions  Lymph nodes: Cervical, supraclavicular, and axillary nodes normal  Neurologic: Normal

## 2021-01-19 PROBLEM — Z34.90 PREGNANT: Status: ACTIVE | Noted: 2020-11-13

## 2021-01-20 ENCOUNTER — OFFICE VISIT (OUTPATIENT)
Dept: FAMILY MEDICINE | Facility: CLINIC | Age: 36
End: 2021-01-20
Payer: COMMERCIAL

## 2021-01-20 VITALS
TEMPERATURE: 97.6 F | RESPIRATION RATE: 16 BRPM | DIASTOLIC BLOOD PRESSURE: 68 MMHG | HEART RATE: 72 BPM | SYSTOLIC BLOOD PRESSURE: 130 MMHG | BODY MASS INDEX: 25.53 KG/M2 | HEIGHT: 67 IN

## 2021-01-20 DIAGNOSIS — Z30.430 ENCOUNTER FOR IUD INSERTION: Primary | ICD-10-CM

## 2021-01-20 LAB — HCG UR QL: NEGATIVE

## 2021-01-20 PROCEDURE — 81025 URINE PREGNANCY TEST: CPT | Performed by: FAMILY MEDICINE

## 2021-01-20 PROCEDURE — 99207 PR DROP WITH A PROCEDURE: CPT | Performed by: FAMILY MEDICINE

## 2021-01-20 PROCEDURE — 58300 INSERT INTRAUTERINE DEVICE: CPT | Performed by: FAMILY MEDICINE

## 2021-01-20 NOTE — PROGRESS NOTES
IUD Insertion Procedure Note    Pre-operative Diagnosis: contraception desired    Post-operative Diagnosis: contraception placed    Indications: contraception    Procedure Details   Urine pregnancy test was done today and result was negative.  The risks (including infection, bleeding, pain, and uterine perforation) and benefits of the procedure were explained to the patient and Written informed consent was obtained.      Cervix cleansed with Betadine. Uterus sounded to 6.5 cm. IUD inserted without difficulty under sterile techniquw. String visible and trimmed. Patient tolerated procedure well.    IUD Information:  Mirena.    Condition:  Stable    Complications:  None    Plan:    The patient was advised to call for any fever or for prolonged or severe pain or bleeding. She was advised to use NSAID as needed for mild to moderate pain.

## 2021-02-17 ENCOUNTER — COMMUNICATION - HEALTHEAST (OUTPATIENT)
Dept: FAMILY MEDICINE | Facility: CLINIC | Age: 36
End: 2021-02-17

## 2021-03-17 ENCOUNTER — MEDICAL CORRESPONDENCE (OUTPATIENT)
Dept: HEALTH INFORMATION MANAGEMENT | Facility: CLINIC | Age: 36
End: 2021-03-17

## 2021-04-24 ENCOUNTER — HEALTH MAINTENANCE LETTER (OUTPATIENT)
Age: 36
End: 2021-04-24

## 2021-05-21 ENCOUNTER — OFFICE VISIT (OUTPATIENT)
Dept: FAMILY MEDICINE | Facility: CLINIC | Age: 36
End: 2021-05-21
Payer: COMMERCIAL

## 2021-05-21 VITALS
HEART RATE: 76 BPM | RESPIRATION RATE: 16 BRPM | HEIGHT: 67 IN | SYSTOLIC BLOOD PRESSURE: 100 MMHG | WEIGHT: 180.38 LBS | BODY MASS INDEX: 28.31 KG/M2 | TEMPERATURE: 98.1 F | DIASTOLIC BLOOD PRESSURE: 68 MMHG

## 2021-05-21 DIAGNOSIS — Z00.00 HEALTHCARE MAINTENANCE: Primary | ICD-10-CM

## 2021-05-21 DIAGNOSIS — Z80.0 FAMILY HISTORY OF COLON CANCER: ICD-10-CM

## 2021-05-21 PROCEDURE — 99395 PREV VISIT EST AGE 18-39: CPT | Performed by: FAMILY MEDICINE

## 2021-05-21 PROCEDURE — G0145 SCR C/V CYTO,THINLAYER,RESCR: HCPCS | Performed by: FAMILY MEDICINE

## 2021-05-21 PROCEDURE — 87624 HPV HI-RISK TYP POOLED RSLT: CPT | Performed by: FAMILY MEDICINE

## 2021-05-21 ASSESSMENT — MIFFLIN-ST. JEOR: SCORE: 1545.81

## 2021-05-23 NOTE — PROGRESS NOTES
"    Assessment/Plan:     Health maintenance female exam.  All questions answered.  Await pap smear results.  Breast self exam technique reviewed and patient encouraged to perform self-exam monthly.  Discussed healthy lifestyle modifications.    BMI:   Estimated body mass index is 28.25 kg/m  as calculated from the following:    Height as of this encounter: 1.702 m (5' 7\").    Weight as of this encounter: 81.8 kg (180 lb 6 oz).   Weight management plan: Discussed healthy diet and exercise guidelines      Healthcare maintenance  - Obtaining, preparing and conveyance of cervical or vaginal smear to laboratory.  - Pap imaged thin layer screen with HPV - recommended age 30 - 65 years (select HPV order below)  - HPV High Risk Types DNA Cervical    Family history of colon cancer  Sister with recent diagnosis of colon cancer.  Referral given for screening colonoscopy.  - GASTROENTEROLOGY ADULT REF PROCEDURE ONLY              Subjective:     Emeli Powell is a 36 year old female who presents for an annual exam.  She is overall doing well.  She is approximately 6 months postpartum.  She feels as though her mood is good overall.    She needs a repeat Pap smear as her Pap smear last year showed normal Pap with positive high risk HPV (not 16 or 18).  She was pregnant at the time and no further work-up was done.    Her sister was recently diagnosed with colon cancer.  She is unsure why her sister got tested.  Her sister is slightly older than her but only by a few years.  She is doing well.      Healthy Habits:   Regular Exercise: Yes  Sunscreen Use: Yes  Healthy Diet: yes  Dental Visits Regularly: yes  Seat Belt: Yes  Self Breast Exam Monthly: yes  Prevention of Osteoporosis: yes    Immunization History   Administered Date(s) Administered     HPV Quadrivalent 07/05/2011     Influenza Vaccine IM > 6 months Valent IIV4 09/02/2020     Influenza,INJ,MDCK,PF,Quad >4yrs 11/21/2019     MMR 11/15/2020     TDAP Vaccine (Boostrix) " 09/02/2020         Gynecologic History  No LMP recorded. (Menstrual status: IUD).  Contraception: IUD Mirena  Last Pap: 2020. Results were: Normal with positive high risk HPV (not 16 or 18      OB History   No obstetric history on file.       No current outpatient medications on file.     No past medical history on file.  No past surgical history on file.  Patient has no known allergies.  No family history on file.  Social History     Socioeconomic History     Marital status:      Spouse name: Not on file     Number of children: Not on file     Years of education: Not on file     Highest education level: Not on file   Occupational History     Not on file   Social Needs     Financial resource strain: Not on file     Food insecurity     Worry: Not on file     Inability: Not on file     Transportation needs     Medical: Not on file     Non-medical: Not on file   Tobacco Use     Smoking status: Never Smoker     Smokeless tobacco: Never Used   Substance and Sexual Activity     Alcohol use: Not on file     Drug use: Not on file     Sexual activity: Not on file   Lifestyle     Physical activity     Days per week: Not on file     Minutes per session: Not on file     Stress: Not on file   Relationships     Social connections     Talks on phone: Not on file     Gets together: Not on file     Attends Mosque service: Not on file     Active member of club or organization: Not on file     Attends meetings of clubs or organizations: Not on file     Relationship status: Not on file     Intimate partner violence     Fear of current or ex partner: Not on file     Emotionally abused: Not on file     Physically abused: Not on file     Forced sexual activity: Not on file   Other Topics Concern     Not on file   Social History Narrative     Not on file       Review of Systems  12 point review of systems was completed and found to be negative except for what is been stated above.      Objective:      Vitals:    05/21/21 1407  "  BP: 100/68   Pulse: 76   Resp: 16   Temp: 98.1  F (36.7  C)   TempSrc: Tympanic   Weight: 81.8 kg (180 lb 6 oz)   Height: 1.702 m (5' 7\")         Physical Exam:  General Appearance: Alert, cooperative, no distress, appears stated age   Head: Normocephalic, without obvious abnormality, atraumatic  Eyes: PERRL, conjunctiva/corneas clear, EOM's intact   Ears: Normal TM's and external ear canals, both ears  Neck: Supple, symmetrical, trachea midline, no adenopathy;  thyroid: not enlarged, symmetric, no tenderness/mass/nodules  Back: Symmetric, no curvature, ROM normal,  Lungs: Clear to auscultation bilaterally, respirations unlabored  Breasts: No breast masses, tenderness, asymmetry, or nipple discharge.  Heart: Regular rate and rhythm, S1 and S2 normal, no murmur, rub, or gallop  Abdomen: Soft, non-tender, bowel sounds active all four quadrants,  no masses, no organomegaly  Pelvic:normal external female genitalia, normal appearing vaginal mucosa and cervix, IUD strings visualized  Extremities: Extremities normal, atraumatic, no cyanosis or edema  Skin: Skin color, texture, turgor normal, no rashes or lesions  Lymph nodes: Cervical, supraclavicular, and axillary nodes normal and   Neurologic: Normal     "

## 2021-05-26 LAB
COPATH REPORT: NORMAL
PAP: NORMAL

## 2021-05-27 LAB
FINAL DIAGNOSIS: NORMAL
HPV HR 12 DNA CVX QL NAA+PROBE: NEGATIVE
HPV16 DNA SPEC QL NAA+PROBE: NEGATIVE
HPV18 DNA SPEC QL NAA+PROBE: NEGATIVE
SPECIMEN DESCRIPTION: NORMAL
SPECIMEN SOURCE CVX/VAG CYTO: NORMAL

## 2021-05-28 NOTE — PROGRESS NOTES
"Assessment/Plan:    Emeli Hernandez is a 34 y.o. female presenting for:    1. Common wart  Procedure note for wart cryotherapy    After verbal consent was obtained by the patient the 2 wart(s) located on the patient's left posterior heel and left interior heel were cleansed with an alcohol swab, pared with a sterile 10 blade and subsequently were frozen with liquid nitrogen using two freeze thaw cycles of 10 seconds each.  Patient tolerated the procedure well with no immediate complication.  Aftercares were discussed.  The patient will return in 2-3 weeks for refreezing if needed.        2. Encounter for contraceptive management, unspecified type  Her and her fiancé are planning on starting to try for pregnancy in September.  We discussed timing of IUD removal.  She is getting  in late August.  We discussed a prenatal vitamin to be started a few months prior to trying to become pregnant.        Medications Discontinued During This Encounter   Medication Reason     clindamycin (CLINDAGEL) 1 % gel      triamcinolone (KENALOG) 0.1 % cream Therapy completed           Chief Complaint:  Chief Complaint   Patient presents with     Warts     Removal L foot and ankle- just showed up other the winter       Subjective:   Emeli Hernandez is a pleasant 34-year-old female presenting to the clinic today with concerns over a wart on her posterior left ankle as well as on her left heel.  These have been there for about 6 months.  She is using Compound W but not seen much improvement.    12 point review of systems completed and negative except for what has been described above    Social History     Tobacco Use   Smoking Status Never Smoker   Smokeless Tobacco Never Used       No current outpatient medications on file.         Objective:  Vitals:    05/08/19 1311   BP: 100/58   Pulse: 68   Resp: 12   Temp: 98  F (36.7  C)   TempSrc: Oral   Weight: 162 lb 7 oz (73.7 kg)   Height: 5' 7.5\" (1.715 m)       Body mass index " is 25.07 kg/m .    Vital signs reviewed and stable  General: No acute distress  Psych: Appropriate affect  HEENT: moist mucous membrane  Extremities: warm and well perfused with no edema  Skin: warm and dry with no rash, a somewhat flat wart on her inner left heel and a prominent 4 mm diameter wart on the posterior aspect of her left ankle.         This note has been dictated and transcribed using voice recognition software.   Any errors in transcription are unintentional and inherent to the software.

## 2021-05-30 VITALS — WEIGHT: 173.25 LBS | BODY MASS INDEX: 27.19 KG/M2 | HEIGHT: 67 IN

## 2021-05-30 VITALS — BODY MASS INDEX: 26.58 KG/M2 | WEIGHT: 169.38 LBS | HEIGHT: 67 IN

## 2021-05-30 VITALS — BODY MASS INDEX: 25.94 KG/M2 | WEIGHT: 165.31 LBS | HEIGHT: 67 IN

## 2021-05-30 NOTE — PROGRESS NOTES
"Assessment/Plan:    Emeli Hernandez is a 34 y.o. female presenting for:    1. Contraception  IUD was removed today.  Birth control sent to the pharmacy for 3 months.  She will be getting  at the end of August.  She is taking a prenatal vitamin and will start trying for pregnancy after her wedding.  Discussed that she should use a alternate form of protection for the first 7 days.  - levonorgestrel-ethinyl estradiol (AVIANE,ALESSE,LESSINA) 0.1-20 mg-mcg per tablet; Take 1 tablet by mouth daily.  Dispense: 3 Package; Refill: 0    2. Viral warts, unspecified type  Please see procedure note below.  Warts frozen today.        There are no discontinued medications.        Chief Complaint:  Chief Complaint   Patient presents with     IUD removal       Subjective:   Emeli Hernandez is a very pleasant 34-year-old female presenting to the clinic today for an IUD removal.  The patient will be getting  in the end of next month and is hoping to become pregnant after that.  She is presenting to have her Mirena IUD removed.  She has done well with the IUD.  She does not really have any vaginal bleeding or periods.  She would like to be on ultra for metformin contraceptive for about 2 months.    Otherwise, patient has some warts on her left foot.  We did freeze one last time which seemed to help however she would like this frozen again.        12 point review of systems completed and negative except for what has been described above    Social History     Tobacco Use   Smoking Status Never Smoker   Smokeless Tobacco Never Used       Current Outpatient Medications   Medication Sig     levonorgestrel-ethinyl estradiol (AVIANE,ALESSE,LESSINA) 0.1-20 mg-mcg per tablet Take 1 tablet by mouth daily.         Objective:  Vitals:    07/10/19 0850   BP: 112/60   Pulse: 92   Resp: 16   Temp: 97.6  F (36.4  C)   TempSrc: Oral   Weight: 163 lb 5 oz (74.1 kg)   Height: 5' 7\" (1.702 m)       Body mass index is 25.58 " kg/m .    Vital signs reviewed and stable  General: No acute distress  Psych: Appropriate affect  HEENT: moist mucous membranes  Abdomen: soft, non tender, non distended with normo-active bowel sounds  Genitourinary: Normal external female genitalia, IUD strings easily visible.  Extremities: warm and well perfused with no edema  Skin: warm and dry with no rash, 2 warts on patient's posterior left ankle and 2 on her heel.      IUD removal procedure note    Pre-operative Diagnosis:Desires preg    Post-operative Diagnosis: :Desires preg    Indications: :Desires preg    Procedure Details   The risks (including infection, bleeding, pain) and benefits of the procedure were explained to the patient and verbal informed consent was obtained.    The patient was laid in the supine position.  A sterile speculum was inserted into the vaginal canal.  The IUD strings were easily visualized, grasped with a ring forceps, and the IUD was gently removed in its entirety.    Condition:  Stable    Complications:  None    Plan:    The patient was advised to call for any fever or for prolonged or severe pain or bleeding. She was advised to use NSAID as needed for mild to moderate pain.     Procedure note for wart cryotherapy    After verbal consent was obtained by the patient the 4 were cleansed with an alcohol swab, pared with a sterile 10 blade and subsequently were frozen with liquid nitrogen using two freeze thaw cycles of 10 seconds each.  Patient tolerated the procedure well with no immediate complication.  Aftercares were discussed.  The patient will return in 2-3 weeks for refreezing if needed.         This note has been dictated and transcribed using voice recognition software.   Any errors in transcription are unintentional and inherent to the software.

## 2021-05-31 VITALS — WEIGHT: 173.44 LBS | BODY MASS INDEX: 27.22 KG/M2 | HEIGHT: 67 IN

## 2021-06-01 ENCOUNTER — PATIENT OUTREACH (OUTPATIENT)
Dept: FAMILY MEDICINE | Facility: CLINIC | Age: 36
End: 2021-06-01

## 2021-06-01 VITALS — HEIGHT: 68 IN | WEIGHT: 163.06 LBS | BODY MASS INDEX: 24.71 KG/M2

## 2021-06-01 NOTE — TELEPHONE ENCOUNTER
5/21/21 NIL pap, Neg HPV. Plan cotest in 1 year due bef 5/21/22.  6/1/21 Result letter sent to pt via [employer].

## 2021-06-03 VITALS — BODY MASS INDEX: 24.62 KG/M2 | WEIGHT: 162.44 LBS | HEIGHT: 68 IN

## 2021-06-03 VITALS — HEIGHT: 67 IN | BODY MASS INDEX: 25.63 KG/M2 | WEIGHT: 163.31 LBS

## 2021-06-04 VITALS — WEIGHT: 173 LBS | BODY MASS INDEX: 27.1 KG/M2 | SYSTOLIC BLOOD PRESSURE: 119 MMHG | DIASTOLIC BLOOD PRESSURE: 61 MMHG

## 2021-06-04 VITALS
HEIGHT: 67 IN | BODY MASS INDEX: 25.55 KG/M2 | DIASTOLIC BLOOD PRESSURE: 64 MMHG | HEART RATE: 80 BPM | SYSTOLIC BLOOD PRESSURE: 112 MMHG | WEIGHT: 162.8 LBS | RESPIRATION RATE: 16 BRPM

## 2021-06-04 VITALS
DIASTOLIC BLOOD PRESSURE: 66 MMHG | WEIGHT: 187.38 LBS | SYSTOLIC BLOOD PRESSURE: 126 MMHG | BODY MASS INDEX: 29.35 KG/M2

## 2021-06-04 VITALS — SYSTOLIC BLOOD PRESSURE: 120 MMHG | WEIGHT: 197.5 LBS | BODY MASS INDEX: 30.93 KG/M2 | DIASTOLIC BLOOD PRESSURE: 67 MMHG

## 2021-06-05 VITALS — WEIGHT: 226 LBS | BODY MASS INDEX: 35.4 KG/M2

## 2021-06-05 VITALS — DIASTOLIC BLOOD PRESSURE: 89 MMHG | SYSTOLIC BLOOD PRESSURE: 135 MMHG | WEIGHT: 225 LBS | BODY MASS INDEX: 35.24 KG/M2

## 2021-06-05 VITALS
SYSTOLIC BLOOD PRESSURE: 138 MMHG | BODY MASS INDEX: 34.16 KG/M2 | WEIGHT: 218.13 LBS | DIASTOLIC BLOOD PRESSURE: 83 MMHG

## 2021-06-05 VITALS — WEIGHT: 196 LBS | SYSTOLIC BLOOD PRESSURE: 114 MMHG | DIASTOLIC BLOOD PRESSURE: 69 MMHG | BODY MASS INDEX: 30.7 KG/M2

## 2021-06-05 VITALS
DIASTOLIC BLOOD PRESSURE: 75 MMHG | SYSTOLIC BLOOD PRESSURE: 138 MMHG | WEIGHT: 213.13 LBS | BODY MASS INDEX: 33.38 KG/M2

## 2021-06-05 VITALS
WEIGHT: 205.13 LBS | BODY MASS INDEX: 32.13 KG/M2 | SYSTOLIC BLOOD PRESSURE: 121 MMHG | DIASTOLIC BLOOD PRESSURE: 72 MMHG

## 2021-06-05 VITALS — SYSTOLIC BLOOD PRESSURE: 140 MMHG | WEIGHT: 226 LBS | BODY MASS INDEX: 35.4 KG/M2 | DIASTOLIC BLOOD PRESSURE: 87 MMHG

## 2021-06-05 VITALS — WEIGHT: 218.38 LBS | SYSTOLIC BLOOD PRESSURE: 117 MMHG | BODY MASS INDEX: 34.2 KG/M2 | DIASTOLIC BLOOD PRESSURE: 80 MMHG

## 2021-06-05 VITALS — HEIGHT: 68 IN | WEIGHT: 226 LBS | BODY MASS INDEX: 34.25 KG/M2

## 2021-06-05 VITALS
DIASTOLIC BLOOD PRESSURE: 85 MMHG | WEIGHT: 219.25 LBS | SYSTOLIC BLOOD PRESSURE: 132 MMHG | BODY MASS INDEX: 34.34 KG/M2

## 2021-06-06 NOTE — TELEPHONE ENCOUNTER
New Appointment Needed  What is the reason for the visit:    Pregnancy Confirmation Appt Needed  When was the first day of your last menstrual cycle?: 01/14/2020  Have you done a home pregnancy test?: Yes: 3/13/19 - positive.    Provider Preference: PCP only  How soon do you need to be seen?: as soon as available  Waitlist offered?: No  Okay to leave a detailed message:  Yes

## 2021-06-07 NOTE — PROGRESS NOTES
PRENATAL VISIT   FIRST OBSTETRICAL EXAM - OB    Assessment / Impression       Normal first prenatal visit at 12W2D -per ultrasound which we are trying to track down from [facility].  Discussed orientation, general information, lifestyle, nutrition, exercise,warning signs, resources, lab testing, risk screening and discussed cystic fibrosis screening with patient.  Questions answered.    Plan:        Initial labs drawn.  Pap sent.  Prenatal vitamins.  Problem list reviewed and updated.  Genetic screening test options discussed:  Patient elects to decline all testing  Role of ultrasound in pregnancy discussed; fetal survey: requested.  Follow up: virtual visit with Dr Silverman 4 weeks, face to face at 20 weeks  Order given for fetal survey.      Subjective:    Emeli Hernandez is a 35 y.o.  here today for her First Obstetrical Exam.  This is her first pregnancy.  She had a virtual pregnancy confirmation with Dr. Allen.  They did set up a dating ultrasound which was quite different from what she thought from her LMP.  This puts her at 12 weeks 2 days today.  I could not hear the baby by fetal heart tones so we placed the handheld ultrasound.  Fetus was observed moving and with cardiac activity.  Discussed screening options and patient declines all first trimester screening.  She states she is otherwise feeling well.  We reviewed her past medical history, family history, surgical history, and genetic history.  I reviewed the entire OB packet with her and answered questions.        OB History    Para Term  AB Living   1             SAB TAB Ectopic Multiple Live Births                  # Outcome Date GA Lbr Khadar/2nd Weight Sex Delivery Anes PTL Lv   1 Current                Expected Date of Delivery: 10/20/2020, by Last Menstrual Period    History reviewed. No pertinent past medical history.  No past surgical history on file.  Social History     Tobacco Use     Smoking status: Never Smoker      "Smokeless tobacco: Never Used   Substance Use Topics     Alcohol use: Not on file     Drug use: Not on file     Current Outpatient Medications   Medication Sig Dispense Refill     PNV no.95/ferrous fum/folic ac (PRENATAL ORAL) Take by mouth.       No current facility-administered medications for this visit.      No Known Allergies          High Risk Behavior: None    Review of Systems  General:  Denies problem  Eyes: Denies problem  Ears/Nose/Throat: Denies problem  Cardiovascular: Denies problem  Respiratory:  Denies problem  Gastrointestinal:  Denies problem, Genitourinary: Denies problem  Musculoskeletal:  Denies problem  Skin: Denies problem  Neurologic: Denies problem  Psychiatric: Denies problem  Endocrine: Denies problem  Heme/Lymphatic: Denies problem   Allergic/Immunologic: Denies problem       Objective:   Objective    Vitals:    05/01/20 1257   BP: 112/64   Pulse: 80   Resp: 16   Weight: 162 lb 12.8 oz (73.8 kg)   Height: 5' 7\" (1.702 m)     Physical Exam:  General Appearance: Alert, cooperative, no distress, appears stated age  Head: Normocephalic, without obvious abnormality, atraumatic  Eyes: PERRL, conjunctiva/corneas clear, EOM's intact  Ears: Normal TM's and external ear canals, both ears  Nose: Nares normal, septum midline,mucosa normal, no drainage  Throat: Lips, mucosa, and tongue normal; teeth and gums normal  Neck: Supple, symmetrical, trachea midline, no adenopathy;  thyroid: not enlarged, symmetric, no tenderness/mass/nodules; no carotid bruit or JVD  Back: Symmetric, no curvature, ROM normal, no CVA tenderness  Lungs: Clear to auscultation bilaterally, respirations unlabored  Breasts: No breast masses, tenderness, asymmetry, or nipple discharge.  Heart: Regular rate and rhythm, S1 and S2 normal, no murmur, rub, or gallop, Abdomen: Soft, non-tender, bowel sounds active all four quadrants,  no masses, no organomegaly  Pelvic:Normally developed genitalia with no external lesions or eruptions. " Vagina and cervix show no lesions, inflammation, discharge or tenderness. No cystocele, No rectocele. Uterus 12w.  No adnexal mass or tenderness.    Extremities: Extremities normal, atraumatic, no cyanosis or edema  Skin: Skin color, texture, turgor normal, no rashes or lesions  Lymph nodes: Cervical, supraclavicular, and axillary nodes normal  Neurologic: Normal     Lab:   Results for orders placed or performed in visit on 03/15/17   Pregnancy, Urine   Result Value Ref Range    Pregnancy Test, Urine Negative Negative    Specific Gravity, UA 1.015 1.001 - 1.030

## 2021-06-07 NOTE — PROGRESS NOTES
"Emeli Hernandez is a 34 y.o. female who is being evaluated via a billable telephone visit.      The patient has been notified of following:     \"This telephone visit will be conducted via a call between you and your physician/provider. We have found that certain health care needs can be provided without the need for a physical exam.  This service lets us provide the care you need with a short phone conversation.  If a prescription is necessary we can send it directly to your pharmacy.  If lab work is needed we can place an order for that and you can then stop by our lab to have the test done at a later time.    If during the course of the call the physician/provider feels a telephone visit is not appropriate, you will not be charged for this service.\"         Emeli Hernandez complains of    Chief Complaint   Patient presents with     Possible Pregnancy     LMP: 1/14/20  OTC UPT Positive       I have reviewed and updated the patient's Past Medical History, Social History, Family History and Medication List.    Assessment/Plan:     Emeli Hernandez is a 34 y.o. female presenting for:     Pregnancy confirmation: pregnancy test is positive at home. Congratulations was given.     She is approximately 10 weeks along with an ITALAI of 10/20/20.   We went over the complete prenatal packet in its entirety.   We discussed lifestyle modifications, safe medications in pregnancy, and foods to avoid.   We discussed trisomy testing today and she will consider Innatal.   Otherwise she will follow up with me at 10 to 12 weeks for her first OB appointment.    Dating ultrasound has [facility] ordered at [facility].  She will contact me once she has this done so I can call over for the results.    Medications Discontinued During This Encounter   Medication Reason     levonorgestrel-ethinyl estradiol (AVIANE,ALESSE,LESSINA) 0.1-20 mg-mcg per tablet      Chief Complaint:  Chief Complaint   Patient presents with     Possible " Pregnancy     LMP: 1/14/20  OTC UPT Positive         Subjective:     Emeli Hernandez is a 34 y.o. female who is a to our clinic presenting for a pregnancy confirmation appointment.  Her LMP started on 1/14/20 placing her due date on 10/20/20.  Her menstrual cycles were very irregular before that.    Cristobal has been got  last August.  They have been trying to conceive since September.  They are very excited about this pregnancy.  She has had a slight amount of spotting but this was after intercourse.  Otherwise no spotting.  She is feeling slightly nauseated.  No vomiting.    She is otherwise very healthy.  She is not on any medications.    Review of Systems - Negative except as above    Medications: reviewed -   Current Outpatient Medications on File Prior to Visit   Medication Sig Dispense Refill     [DISCONTINUED] levonorgestrel-ethinyl estradiol (AVIANE,ALESSE,LESSINA) 0.1-20 mg-mcg per tablet Take 1 tablet by mouth daily. 3 Package 0     No current facility-administered medications on file prior to visit.        Past medical history: reviewed - No past medical history on file.    Past surgical history: reviewed - No past surgical history on file.    Family history: reviewed -   Family History   Problem Relation Age of Onset     No Medical Problems Mother      No Medical Problems Father      Heart disease Paternal Grandmother        Social history: reviewed -   Social History     Socioeconomic History     Marital status: Single     Spouse name: Not on file     Number of children: Not on file     Years of education: Not on file     Highest education level: Not on file   Occupational History     Not on file   Social Needs     Financial resource strain: Not on file     Food insecurity     Worry: Not on file     Inability: Not on file     Transportation needs     Medical: Not on file     Non-medical: Not on file   Tobacco Use     Smoking status: Never Smoker     Smokeless tobacco: Never Used   Substance  and Sexual Activity     Alcohol use: Not on file     Drug use: Not on file     Sexual activity: Yes   Lifestyle     Physical activity     Days per week: Not on file     Minutes per session: Not on file     Stress: Not on file   Relationships     Social connections     Talks on phone: Not on file     Gets together: Not on file     Attends Taoist service: Not on file     Active member of club or organization: Not on file     Attends meetings of clubs or organizations: Not on file     Relationship status: Not on file     Intimate partner violence     Fear of current or ex partner: Not on file     Emotionally abused: Not on file     Physically abused: Not on file     Forced sexual activity: Not on file   Other Topics Concern     Not on file   Social History Narrative     Not on file                       Phone call duration:  35 minutes    Jessica Silverman MD

## 2021-06-07 NOTE — TELEPHONE ENCOUNTER
Called pt and LMTCB. On pt's return call please inform pt that we are currently trying to limit nonessential visits to the clinic given the COVID-19 outbreak. On that note please inform pt that Dr. Silverman has determined that her appt could be held as a Telephone Visit. Please confirm if pt is ok with a Telephone Visit and document call was returned before closing task. Thank you.

## 2021-06-07 NOTE — TELEPHONE ENCOUNTER
Patient Returning Call  Reason for call:  Patient returning phone call  Information relayed to patient:  Message below- patient is ok with doing a phone visit  Patient has additional questions:  No  If YES, what are your questions/concerns:  n/a  Okay to leave a detailed message?: No call back needed

## 2021-06-07 NOTE — PATIENT INSTRUCTIONS - HE
So great to talk with you guys!  Congrats again!!!  Have a happy birthday!    Modified low risk prenatal care:    Pregnancy confirmation - telephone virtual visit  10-11 week - first OB visit in person at the clinic and routine labs  16-week - telephone virtual visit  20-week - in person at clinic visit and review anatomy ultrasound  24-week - telephone virtual visit  28-week - in person visit at clinic and 1 hour blood sugar test  30-week - telephone virtual visit  32-week - in person visit at clinic  34-week - telephone virtual visit  36-week - in person visit at clinic  37-week - telephone virtual visit  38 weeks and weekly thereafter - in person visit at clinic      Pregnancy Information    [facility] for delivery - #[phone]     [place] for dates at around 8-10 weeks - order sent to [facility] in [place] ([phone])    Testing for trisomies (downs syndrome, trisomy 13 and trisomy 18) - Progenity genetic testing done any time after 10 weeks    US for gender and fetal survey at about 20 weeks    Blood sugar test between 24 and 28 weeks    Continue to take a prenatal vitamin - I recommend one with 800 mcg or folic acid, 27mg of elemental iron and 150 mcg of iodine     I also recommend shooting for 1,000-1,300 mg/day of Calcium (either through vitamin or diet)    Nausea in pregnancy:  I would recommend vitamin B6 for nausea.  Dosing as follows:  -Pyridoxine (B6) - 25 mg orally every six to eight hours; the maximum treatment dose suggested for pregnant women is 200 mg/day  -You can also add on Unisom (Doxyamine) - 20mg at night and up to two 10 mg doses throughout the day (for a total of up to 40mg/day) although it will likely make you drowsy

## 2021-06-08 NOTE — PROGRESS NOTES
"Emeli Powell is a 35 y.o. female who is being evaluated via a billable video visit.      The patient has been notified of following:     \"This video visit will be conducted via a call between you and your physician/provider. We have found that certain health care needs can be provided without the need for an in-person physical exam.  This service lets us provide the care you need with a video conversation.  If a prescription is necessary we can send it directly to your pharmacy.  If lab work is needed we can place an order for that and you can then stop by our lab to have the test done at a later time.    Video visits are billed at different rates depending on your insurance coverage. Please reach out to your insurance provider with any questions.    If during the course of the call the physician/provider feels a video visit is not appropriate, you will not be charged for this service.\"    Patient has given verbal consent to a Video visit? Yes    Patient would like to receive their AVS by AVS Preference: Fatmata.    Patient would like the video invitation sent by: Text to cell phone: [phone]    Will anyone else be joining your video visit? No        Video Start Time: 7:58 AM    -------------------------    Assessment/Plan:    Emeli Powell is a 35 y.o. female presenting for:    1. Encounter for supervision of normal first pregnancy in second trimester    Overall doing very well.  Encourage Tylenol if she needs for headaches.  Otherwise, good hydration.    She will follow-up with me on the 19th in clinic and has an appointment for her fetal survey ultrasound on the 25th.  They are not planning on finding out the gender.      There are no discontinued medications.        Chief Complaint:  Chief Complaint   Patient presents with     Routine Prenatal Visit     16w 2d       Subjective:   Emeli Powell is a pleasant 35 y.o. female being evaluated via video visit today for the following concern/s:     G1, P0 " at 16 weeks and 2 days for prenatal care in a uncomplicated first pregnancy in the second trimester.    Overall doing well.  Mild headaches but has not needed to take anything.  Nausea has abated.  She will be getting her ultrasound at [facility] on June 25.  She will be seeing me on June 19 in clinic face-to-face.    No movement yet.  No lower extremity swelling.  No cramping.    She works from home.  Her  works doing [employer] and is an essential worker.    12 point review of systems completed and negative except for what has been described above    Social History     Tobacco Use   Smoking Status Never Smoker   Smokeless Tobacco Never Used       Current Outpatient Medications   Medication Sig     PNV no.95/ferrous fum/folic ac (PRENATAL ORAL) Take by mouth.         Objective:  There were no vitals filed for this visit.    There is no height or weight on file to calculate BMI.    General: No acute distress  Psych: Appropriate affect  HEENT: moist mucous membranes  Pulmonary: Breathing comfortably, speaking in complete sentences  Extremities: warm and well perfused with no edema  Skin: warm and dry with no rash         This note has been dictated and transcribed using voice recognition software.   Any errors in transcription are unintentional and inherent to the software.        Video-Visit Details    Type of service:  Video Visit    Video End Time (time video stopped): 8:07 AM  Originating Location (pt. Location): Home    Distant Location (provider location):  [facility] FAMILY MEDICINE/OB     Platform used for Video Visit: Alex Silverman MD

## 2021-06-09 NOTE — TELEPHONE ENCOUNTER
6/25 - Average ultrasound age of 20 weeks and 1 day- [facility] 11/12/2020.  Overall appropriate growth.  Anterior placenta, no previa- marginal cord insertion.  (Generally recommend growth in 3rd trimester for this).No structural abnormalities noted on fetal survey.  Gender not disclosed.

## 2021-06-09 NOTE — TELEPHONE ENCOUNTER
Spoke to pt, scheduled 24 week appt, pt prefers video, scheduled on 7/21. Also scheduled pt's 28 week appt on 8/19 as f2f with Dr. Silverman.  Pt will check her mychart to read message Dr. Silverman sent as well.

## 2021-06-09 NOTE — PROGRESS NOTES
Assessment/Plan:     Health maintenance female exam.  All questions answered.  Await pap smear results.  Breast self exam technique reviewed and patient encouraged to perform self-exam monthly.  Discussed healthy lifestyle modifications.  The following high BMI interventions were performed this visit: encouragement to exercise and weight monitoring    1. Contraception  She will plan on getting her IUD replaced.  We discussed the risks and benefits of the IUD.  We discussed the risks of insertion including bleeding, infection, and wound in a very rare case uterine perforation.  I did see the IUD strings of her  IUD today and we'll plan on taking that out when we place her new one.  She'll plan on coming in sometime in the next few weeks for this.  She will use Cytotec vaginally prior to having her IUD removed.  - miSOPROStol (CYTOTEC) 200 MCG tablet; Use 2 tabs vaginally 4-6 hours prior to iud insertion  Dispense: 2 tablet; Refill: 0    2. Routine general medical examination at a health care facility  - Gynecologic Cytology (PAP Smear)          Subjective:      Emeli Hernandez is a 31 y.o. female who presents for an annual exam.  She is overall doing very well.  She is new to our clinic.  She states that she is due for a Pap smear.  She had one about 5 years ago which was normal.  She's never had an abnormal.  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  She likes her job.  She has a major in marketing.  She lives with her boyfriend of 4 years.  He owns a construction company.  She believes that she may be would want to be pregnant in the next 2 or 3 years.  She has intrauterine device which was placed about 5 years ago which she knows she needs to get exchanged.  She does like the IUD and would like to continue using it as a form of birth control.    Healthy Habits:   Regular Exercise: Yes  Sunscreen Use: Yes  Healthy Diet: Yes  Dental Visits Regularly: Yes  Seat Belt: Yes  Sexually  active: Yes  Self Breast Exam Monthly:Yes  Colonoscopy: N/A  Prevention of Osteoporosis: N/A  Last Dexa: N/A    Immunization History   Administered Date(s) Administered     HPV Quadrivalent 07/05/2011     Immunization status: up to date and documented.    Gynecologic History  No LMP recorded. Patient has had an implant.  Contraception: IUD  Last Pap: 2012. Results were: normal        OB History   No data available       Current Outpatient Prescriptions   Medication Sig Dispense Refill     miSOPROStol (CYTOTEC) 200 MCG tablet Use 2 tabs vaginally 4-6 hours prior to iud insertion 2 tablet 0     No current facility-administered medications for this visit.      History reviewed. No pertinent past medical history.  History reviewed. No pertinent surgical history.  Review of patient's allergies indicates no known allergies.  Family History   Problem Relation Age of Onset     No Medical Problems Mother      No Medical Problems Father      Heart disease Paternal Grandmother      Social History     Social History     Marital status: Single     Spouse name: N/A     Number of children: N/A     Years of education: N/A     Occupational History     Not on file.     Social History Main Topics     Smoking status: Never Smoker     Smokeless tobacco: Not on file     Alcohol use Not on file     Drug use: Not on file     Sexual activity: Yes     Other Topics Concern     Not on file     Social History Narrative     No narrative on file       Review of Systems  General:  Denies problems  Eyes:  Denies problems  Ears/Nose/Throat:  Denies problems  Cardiovascular:  Denies problems  Respiratory:  Denies problems  Gastrointestinal:  Denies problems  Genitourinary:  Denies problems  Musculoskeletal:  Denies problems  Skin:  Denies problems, Neurologic:  Denies problems  Psychiatric:  Denies problems  Endocrine:  Denies problems  Heme/Lymphatic:  Denies problems  Allergic/Immunologic:  Denies problems       Objective:         Vitals:     "03/08/17 1322   BP: 110/64   Pulse: 76   Resp: 16   Temp: 98.3  F (36.8  C)   TempSrc: Oral   Weight: 165 lb 5 oz (75 kg)   Height: 5' 7.25\" (1.708 m)       Physical Exam:  General Appearance: Alert, cooperative, no distress, appears stated age   Head: Normocephalic, without obvious abnormality, atraumatic  Eyes: PERRL, conjunctiva/corneas clear, EOM's intact   Ears: Normal TM's and external ear canals, both ears  Nose:Nares normal, septum midline,mucosa normal, no drainage    Throat:Lips, mucosa, and tongue normal; teeth and gums normal  Neck: Supple, symmetrical, trachea midline, no adenopathy;  thyroid: not enlarged, symmetric, no tenderness/mass/nodules  Back: Symmetric, no curvature, ROM normal,  Lungs: Clear to auscultation bilaterally, respirations unlabored  Breasts: No breast masses, tenderness, asymmetry, or nipple discharge.  Heart: Regular rate and rhythm, S1 and S2 normal, no murmur, rub, or gallop  Abdomen: Soft, non-tender, bowel sounds active all four quadrants,  no masses, no organomegaly  Pelvic:normal external female genitalia, normal appearing vaginal mucosa and cervix  Extremities: Extremities normal, atraumatic, no cyanosis or edema  Skin: Skin color, texture, turgor normal, no rashes or lesions  Lymph nodes: Cervical, supraclavicular, and axillary nodes normal and   Neurologic: Normal       "

## 2021-06-09 NOTE — PROGRESS NOTES
IUD removal/Insertion Procedure Note    Pre-operative Diagnosis: Contraception     Post-operative Diagnosis: normal    Indications: contraception    Procedure Details   Urine pregnancy test was done today and result was negative.  The risks (including infection, bleeding, pain, and uterine perforation) and benefits of the procedure were explained to the patient and Written informed consent was obtained.      Patient was positioned and the the old IUD strings were visualized.  The strings were grasped with forceps and the old IUD was gently removed.   Cervix cleansed with Betadine. Uterus sounded to 7 cm. IUD inserted without difficulty. String visible and trimmed. Patient tolerated procedure well.    IUD Information:  Mirena.    Condition:  Stable    Complications:  None    Plan:    The patient was advised to call for any fever or for prolonged or severe pain or bleeding. She was advised to use NSAID as needed for mild to moderate pain.     Aftercares discussed

## 2021-06-09 NOTE — PROGRESS NOTES
Emeli is a pleasant 35-year-old G1 presenting to the clinic today at 19 weeks and 2 days gestation for prenatal care in a uncomplicated first pregnancy in the second trimester.    She has her fetal survey scheduled with [facility] on June 25.    They are not finding out the gender.    Overall doing well.  No fetal movement yet.  Fetal heart rate at 156 with Doppler and bedside ultrasound done.    Next visit is a virtual visit at 24 weeks with a 28-week visit for 1 hour blood sugar testing.

## 2021-06-09 NOTE — TELEPHONE ENCOUNTER
Can you please help her schedule a 24 week visit (F2F or virtual per her preference)    Can you also let her know that I sent her a [employer] message?    Thanks    BB

## 2021-06-09 NOTE — PROGRESS NOTES
"Emeli Powell is a 35 y.o. female who is being evaluated via a billable video visit.      The patient has been notified of following:     \"This video visit will be conducted via a call between you and your physician/provider. We have found that certain health care needs can be provided without the need for an in-person physical exam.  This service lets us provide the care you need with a video conversation.  If a prescription is necessary we can send it directly to your pharmacy.  If lab work is needed we can place an order for that and you can then stop by our lab to have the test done at a later time.    Video visits are billed at different rates depending on your insurance coverage. Please reach out to your insurance provider with any questions.    If during the course of the call the physician/provider feels a video visit is not appropriate, you will not be charged for this service.\"    Patient has given verbal consent to a Video visit? Yes  How would you like to obtain your AVS? AVS Preference: SomnoMedhart.  If dropped by the video visit, the video invitation should be sent to: Text to cell phone: #  Will anyone else be joining your video visit? No        Video Start Time: 402pm    Emeli is a very pleasant 35-year-old G1, P0 presenting via video visit at 23 weeks and 6 days gestation for prenatal care in a normal first pregnancy in the second trimester.    She is overall feeling well.  On her ultrasound a marginal cord insertion was noted.  It is recommended that she has a growth ultrasound in the third trimester.    Otherwise, she is doing well.  She is having some difficulty sleeping and would like to discuss some positional things at this point.    No other questions or concerns.  Discussed 1 hour blood sugar test at her next appointment.      Video-Visit Details    Type of service:  Video Visit    Video End Time (time video stopped): 418pm  Originating Location (pt. Location): Home    Distant Location " (provider location):  [facility] FAMILY MEDICINE/OB     Platform used for Video Visit: Alex Silverman MD

## 2021-06-10 NOTE — PROGRESS NOTES
"Assessment/Plan:    Emeli Hernandez is a 32 y.o. female presenting for:     IUD string check: string is in place. She's doing wealth IUD. Let me know if there any issues I recommended that she check this on her own monthly.      There are no discontinued medications.        Chief Complaint:  Chief Complaint   Patient presents with     IUD String Check       Subjective:   Emeli Hernandez  It is a very pleasant 32-year-old female presented to the clinic today for an IUD string check.   We had replaced her Mirena IUD one month ago. She had some mild cramping at the after the procedure as well some mild bleeding. Those have dissipated. She's doing well with IUD. She is no questions or concerns. The string does not bother her or her partner.    12 point review of systems completed and negative except for what has been described above    History   Smoking Status     Never Smoker   Smokeless Tobacco     Not on file       Current Outpatient Prescriptions   Medication Sig     miSOPROStol (CYTOTEC) 200 MCG tablet Use 2 tabs vaginally 4-6 hours prior to iud insertion         Objective:  Vitals:    04/12/17 1642   BP: 116/68   Pulse: 64   Resp: 12   Temp: 98.4  F (36.9  C)   TempSrc: Oral   Weight: 173 lb 4 oz (78.6 kg)   Height: 5' 7.25\" (1.708 m)       Vital signs reviewed and stable  General: No acute distress  Psych: Appropriate affect  HEENT: moist mucous membranes  Abdomen: soft, non tender, non distended with normo-active bowel sounds   Genitourinary: normal external female genitalia, normal appearing cervix with IUD strings protruding about 3 cm.  Extremities: warm and well perfused with no edema  Skin: warm and dry with no rash         This note has been dictated and transcribed using voice recognition software.   Any errors in transcription are unintentional and inherent to the software.  "

## 2021-06-10 NOTE — PROGRESS NOTES
Emeli is a very pleasant 35-year-old G1 at 28 weeks gestation presenting for prenatal care and a normal first pregnancy in the second trimester.    1 hour blood sugar testing today.  Overall doing well.  Sleeping a bit better since getting a new mattress.    No other specific questions or concerns today.  She had a marginal cord insertion and if she has a repeat ultrasound scheduled to recheck growth at the beginning of September at [facility].  She will contact me after she gets this done so I can follow-up.

## 2021-06-11 NOTE — PROGRESS NOTES
"Emeli is a very pleasant 35-year-old  at 30 weeks and 0 days gestation presenting for prenatal care and a normal first pregnancy in the third trimester.  She had her 1 hour blood sugar test 2 weeks ago when she passed this.    Really no other specific questions or concerns.  Influenza vaccine as well as Tdap will be done today.    Patient has a history of a marginal cord insertion and will be getting a repeat ultrasound with growth parameters on Friday at [facility].    She had a ultrasound at a \"4D\" ultrasound location which appeared normal.  Vertex position per her report.    She has been noticing some mild lower extremity edema  "

## 2021-06-11 NOTE — PROGRESS NOTES
Emeli is a very pleasant 35-year-old G1, P0 at 34 weeks and 0 days gestation presenting for prenatal care and a normal first pregnancy in the third trimester.    Information was given for preregistration.  Discussed group B strep testing next time.    Good fetal movement.  No other specific questions or concerns.  She believes that she would like to try laboring without an epidural with but would be open to getting one if needed.    She has been having intermittent [place] contractions.  She went to the chiropractor and has been sleeping well since then.    She has a follow-up ultrasound next Monday at [facility] for a marginal cord insertion.

## 2021-06-11 NOTE — PROGRESS NOTES
Emeli is a very pleasant 35-year-old G1, P0 at 32 weeks and 0 days gestation presenting for prenatal care and a normal first pregnancy in the third trimester.    She really has no specific questions or concerns today.  She had a repeat ultrasound due to a marginal cord insertion at [facility] which showed normal growth.  They recommended a repeat ultrasound in 1 month which will be in early October.    She is taking a prenatal /parenting class and that a started yesterday.    She thinks that she would like to try laboring without any pain medication but would be willing to discuss if needed.  We did discuss IV, nitrous oxide and epidurals today.

## 2021-06-11 NOTE — PATIENT INSTRUCTIONS - HE
"Great to see you - this link will lead you to your Maternity Registration form - if it asks for a \"date\" please just enter your due date.  :)    [email or web address]    Have a great few weeks - I will let you know when I hear about that US!    Dr Silverman  "

## 2021-06-12 NOTE — PROGRESS NOTES
Emeli is a very pleasant 35-year-old G1, P0 at 39 weeks and 2 days gestation presenting to the clinic today for prenatal care and a normal first pregnancy in the third trimester.  Blood pressures have unfortunately been slightly elevated.  Initial blood pressure today 135/89 with a repeat of of 134/80.    Minimal to 1+ swelling.  No headaches or right upper quadrant Pain.  No vision changes.    She will follow up with me in 5 days.  Discussed signs and symptoms once again of preeclampsia.  Urine OB was normal today.

## 2021-06-12 NOTE — PROGRESS NOTES
Emeli is a very pleasant 35-year-old G1, P0 at 36 weeks and 2 days gestation presenting to the clinic today for prenatal care in a normal first pregnancy in the third trimester.    She has been noticing some lower extremity edema which generally resolves with elevating her feet.  Group B strep test today.    Good fetal movement.  Repeat ultrasound showed normal growth.    Blood pressure slightly elevated today.  No protein in urine.  Discussed signs and symptoms of preeclampsia.  Follow-up in 1 week

## 2021-06-12 NOTE — PROGRESS NOTES
Emeli is a very pleasant 35-year-old G1, P0 at 37 weeks and 2 days gestation presenting to the clinic today for prenatal care and a normal first pregnancy in third trimester.  Patient's blood pressures have unfortunately been becoming a bit more elevated.  Today initial blood pressure is 138/83 with a repeat of 134/80.    She has been having some swelling for the last few weeks but it has not been increasing.  No right upper quadrant pain.  Group B strep test is negative.    Good fetal movement.    No protein in urine.  CMP and CBC drawn.  She will follow up on Monday for a recheck.    We discussed signs and symptoms of preeclampsia once again.  Discussed when would be appropriate to present to the emergency department.

## 2021-06-12 NOTE — PROGRESS NOTES
Emeli is a very pleasant 35-year-old G1, P0 at 37 weeks and 5 days gestation presenting to the clinic today for prenatal care and a normal first pregnancy in the third trimester.  Patient's blood pressures have been elevated her last 2 visits but today was normal at 117/80.    Mild swelling in her legs.  We had done some blood work at her last visit which was normal.  No protein in the urine.    She will follow up on Friday for recheck.  If blood pressure continues to be normal could go back to weekly visits.

## 2021-06-12 NOTE — PROGRESS NOTES
Emeli is a very pleasant 35-year-old G1, P0 at 38 weeks and 2 days gestation presenting to the clinic today for prenatal care in a normal first pregnancy in the third trimester. Blood pressures have been slightly elevated. Earlier this week it was normal at 117/80 and slightly elevated again today at 132/85. Minimal stable swelling. No headaches or right upper quadrant pain. No vision changes.    Follow-up in 1 week. Discussed signs and symptoms of preeclampsia and she will contact us immediately if she is noticing any of those symptoms. Urine OB was normal.

## 2021-06-13 NOTE — TELEPHONE ENCOUNTER
Informed patient that  will be at the [facility] location starting next week. Patient will call and make appt.

## 2021-06-13 NOTE — PROGRESS NOTES
Dr. Silverman phoned of pt triage statements and assessments. Orders received to discharge pt to home.   Karen J Schoenberg

## 2021-06-13 NOTE — ANESTHESIA POSTPROCEDURE EVALUATION
Patient: Emeli Powell  * No procedures listed *  Anesthesia type: epidural    Patient location: N7  Last vitals: No vitals data found for the desired time range.  Satisfied with epidural for labor, has ambulated.  Post vital signs: stable  Level of consciousness: alert  Post-anesthesia pain: pain controlled  Post-anesthesia nausea and vomiting: no  Pulmonary: unassisted, return to baseline  Cardiovascular: stable and blood pressure at baseline  Hydration: adequate  Anesthetic events: no    QCDR Measures:  ASA# 11 - June-op Cardiac Arrest: ASA11B - Patient did NOT experience unanticipated cardiac arrest  ASA# 12 - June-op Mortality Rate: ASA12B - Patient did NOT die  ASA# 13 - PACU Re-Intubation Rate: ASA13B - Patient did NOT require a new airway mgmt  ASA# 10 - Composite Anes Safety: ASA10A - No serious adverse event    Additional Notes:

## 2021-06-13 NOTE — PROGRESS NOTES
here for triage due to some bloody discharge since VE after office visit today> pt states this is small amount dark red and one fingernail sized clot.   Pt oriented to triage care. Placed on EFM. Triage assessments done. Pt reassured after VE that discharge is minimal and to be expected occasionally after VE. Will continue to assess and call Dr. Silverman.    States Bp has been borderline with no proteinuria. Only 1-2+ pitting edema noted bilaterally. Denies headache or visual or epigastric pain.     Karen J Schoenberg

## 2021-06-13 NOTE — PROGRESS NOTES
0800 pt here for labor induction. Pt is g1 and 40.2 weeks. Pt c/o have been leaking some clear vaginal fluid since 0930 last night. rom plus was sent.   0830 dr ochoa was called about rom plus sent and vaginal exam with a 9 salcedo. Orders given to to do pitocin.  0910 pt requested to go home and wait for natural labor. Dr ochoa was called back about pt's request to go home. Dr ochoa was ok for pt to go home but after having BPP. If BPP 8/8, pt can go home without calling dr ochoa back.  1045 BPP was 8/8. Pt was discharged home per dr ochoa orders. Pt was sent home stable condition, not labor.

## 2021-06-13 NOTE — ANESTHESIA PREPROCEDURE EVALUATION
Anesthesia Evaluation      Patient summary reviewed   No history of anesthetic complications     Airway   Mallampati: II  Neck ROM: full   Pulmonary - negative ROS   (-) asthma, sleep apnea, not a smoker                         Cardiovascular - negative ROS  (+) hypertension, ,      Neuro/Psych - negative ROS     Endo/Other - negative ROS   (+) pregnant  (-) no diabetes     GI/Hepatic/Renal - negative ROS      Other findings: 34 yo F, , spontaneous onset of labor at term pregnancy complicated by mild gestational hypertension otherwise asymptomatic (denies RUQ pain, headache, vision changes).     COVID negative 20    Hbg 13.9  Plt 222  INR 0.91      Dental      Comment: Good dentition, no loose or removable teeth                       Anesthesia Plan  Planned anesthetic: epidural  Patient requests labor epidural. Chart reviewed, including labs. Reviewed options and risks with the patient, including but not limited to: bleeding, infection, damage to tissues under the skin (nerves, muscles, blood vessels), hypotension, headache, and epidural failure. Questions answered, consent signed. Patient agrees to elective labor epidural.     ASA 2     Anesthetic plan and risks discussed with: patient and spouse    Post-op plan: epidural analgesia

## 2021-06-13 NOTE — PROGRESS NOTES
Emeli is a very pleasant 35-year-old G1, P0 at 40 weeks and 0 days gestation presenting to the clinic today for prenatal care in a first pregnancy in the third trimester.  Blood pressures have been somewhat elevated and she has reached to the diagnosis of gestational hypertension today.    1+ lower extremity edema.  Some swelling in hands.  No headaches or right upper quadrant pain.  No vision changes.  No protein in her urine.    Laboratory testing was done today.  Covid swab done.  Induction scheduled for Friday.  Discussed signs and symptoms that would necessitate earlier follow-up.

## 2021-06-13 NOTE — ANESTHESIA PROCEDURE NOTES
Epidural Block    Patient location during procedure: OB  Time Called: 11/13/2020 8:45 PM  Reason for Block:labor epidural  Staffing:  Performing  Anesthesiologist: Tianna Salazar MD  Preanesthetic Checklist  Completed: patient identified, risks, benefits, and alternatives discussed, timeout performed, consent obtained, at patient's request, airway assessed, oxygen available, suction available, emergency drugs available and hand hygiene performed  Procedure  Patient position: sitting  Prep: ChloraPrep  Patient monitoring: continuous pulse oximetry, heart rate and blood pressure  Approach: midline  Location: L3-L4  Injection technique: JULIAN air and JULIAN saline  Number of Attempts:2  Needle  Needle type: Etelosfranko   Needle gauge: 18 G     Catheter in Space: 5  Assessment  Sensory level:  No complications    Events: paresthesia     Additional Notes:  JULIAN at 7 cm. Catheter at 12 cm. Transient left paresthesia otherwise uncomplicated.

## 2021-06-13 NOTE — H&P
Patient was being admitted today for a labor induction due to mild gestational hypertension and postdates.    She was found to have a Triplett score of 9.  She was having some mild contractions.  Blood pressures were less than the threshold for induction.    We discussed Pitocin but she declines at this point.  BPP was normal.  Patient was not admitted and was sent home in hopes that she would start labor on her own.    JENARO

## 2021-06-15 NOTE — PROGRESS NOTES
"Assessment/Plan:    Emeli Hernandez is a 32 y.o. female presenting for:    1. Dermatitis  Appears to be a fungal dermatitis.  She has been trying over-the-counter Lotrimin which she will continue doing.  Otherwise I did send fluconazole to the pharmacy which she will take daily for 7 days.  She will let me know how things go with this medication.  - fluconazole (DIFLUCAN) 200 MG tablet; Take 1 tablet (200 mg total) by mouth daily for 7 days.  Dispense: 7 tablet; Refill: 0        Medications Discontinued During This Encounter   Medication Reason     miSOPROStol (CYTOTEC) 200 MCG tablet Therapy completed           Chief Complaint:  Chief Complaint   Patient presents with     Rash     Bilateral armpits.       Subjective:   Emeli Hernandez is a very pleasant 32-year-old female presenting to the clinic today for an armpit rash.  She noticed this about 2 weeks ago.  He has been getting worse since that time.  She has been putting over-the-counter antifungal medication on it which seems like it helps with the itching but does not help take care of the rash.  She cannot think of any new exposures.  She did change her laundry detergent but it did not seem to matter.    12 point review of systems completed and negative except for what has been described above    History   Smoking Status     Never Smoker   Smokeless Tobacco     Not on file       Current Outpatient Prescriptions   Medication Sig     fluconazole (DIFLUCAN) 200 MG tablet Take 1 tablet (200 mg total) by mouth daily for 7 days.         Objective:  Vitals:    01/19/18 1650   BP: 108/62   Pulse: 60   Resp: 12   Temp: 98.2  F (36.8  C)   TempSrc: Oral   Weight: 173 lb 7 oz (78.7 kg)   Height: 5' 7.25\" (1.708 m)       Vital signs reviewed and stable  General: No acute distress  Psych: Appropriate affect  HEENT: moist mucous membranes  Skin: warm and dry with a slightly raised somewhat circular rash in bilateral armpits with a somewhat serpiginous border and " central clearing that appears consistent with tinea corporis         This note has been dictated and transcribed using voice recognition software.   Any errors in transcription are unintentional and inherent to the software.

## 2021-06-18 NOTE — PROGRESS NOTES
"    Assessment/Plan:     Health maintenance female exam.  All questions answered.  PAP UTD  Breast self exam technique reviewed and patient encouraged to perform self-exam monthly.  Discussed healthy lifestyle modifications.  Await fasting lab results  The following high BMI interventions were performed this visit: encouragement to exercise and weight monitoring    1. Dermatitis  She will switch her underarm deodorant.  Referral to dermatology was given.  Clindamycin gel was given for her face to see if this will help in case there is an underlying infection.  She will keep me updated regarding the situation.  She may benefit from seeing an allergist as well and she will discuss this with her dermatologist.  I have also recommended that she begin taking a daily Claritin.   - clindamycin (CLINDAGEL) 1 % gel; Apply to affected area 2 times daily  Dispense: 30 g; Refill: 0  - Ambulatory referral to Dermatology    2.  Healthcare maintenance  Up-to-date with Pap smear.  No lab testing indicated at this time.      Subjective:      Emeli Hernandez is a 33 y.o. female who presents for an annual exam.  She is overall doing well.  She has 2 main concerns today:    1.  Underarm dermatitis: I saw the patient for this a few months ago.  We had given her an antifungal which did not seem to be helpful.  I then prescribed a steroid cream which seemed to take care of the rash.  She stopped using the cream and it came back.  She is using a \"all natural\" deodorant she has been using for a while.  She cannot think of any new exposures.    2.  Facial dermatitis: Patient notes over the last few weeks she has been getting some erythematous somewhat scaly patches under her eyes bilaterally.  No new facial soaps or lotions.  She does not she really has seasonal allergies.  She states that they will wax and wane.  Again no new products.    Healthy Habits:   Regular Exercise: Yes  Sunscreen Use: Yes  Healthy Diet: Yes  Dental Visits " Regularly: Yes  Seat Belt: Yes  Sexually active: Yes  Self Breast Exam Monthly:Yes  Colonoscopy: N/A  Prevention of Osteoporosis: Yes  Last Dexa: N/A    Immunization History   Administered Date(s) Administered     HPV Quadrivalent 07/05/2011     Immunization status: up to date and documented.    Gynecologic History  No LMP recorded. Patient has had an implant.  Contraception: IUD  Last Pap: 2017. Results were: normal      OB History   No data available       Current Outpatient Prescriptions   Medication Sig Dispense Refill     triamcinolone (KENALOG) 0.1 % cream Use twice daily to axillary area bilaterally 30 g 1     clindamycin (CLINDAGEL) 1 % gel Apply to affected area 2 times daily 30 g 0     No current facility-administered medications for this visit.      No past medical history on file.  No past surgical history on file.  Review of patient's allergies indicates no known allergies.  Family History   Problem Relation Age of Onset     No Medical Problems Mother      No Medical Problems Father      Heart disease Paternal Grandmother      Social History     Social History     Marital status: Single     Spouse name: N/A     Number of children: N/A     Years of education: N/A     Occupational History     Not on file.     Social History Main Topics     Smoking status: Never Smoker     Smokeless tobacco: Never Used     Alcohol use Not on file     Drug use: Not on file     Sexual activity: Yes     Other Topics Concern     Not on file     Social History Narrative       Review of Systems  General:  Denies problems  Eyes:  Denies problems  Ears/Nose/Throat:  Denies problems  Cardiovascular:  Denies problems  Respiratory:  Denies problems  Gastrointestinal:  Denies problems  Genitourinary:  Denies problems  Musculoskeletal:  Denies problems  Skin:  Denies problems, Neurologic:  Denies problems  Psychiatric:  Denies problems  Endocrine:  Denies problems  Heme/Lymphatic:  Denies problems  Allergic/Immunologic:  Denies  "problems       Objective:         Vitals:    05/16/18 1556   BP: 106/68   Pulse: 80   Resp: 12   Temp: 98.4  F (36.9  C)   TempSrc: Oral   Weight: 163 lb 1 oz (74 kg)   Height: 5' 7.5\" (1.715 m)       Physical Exam:  General Appearance: Alert, cooperative, no distress, appears stated age   Head: Normocephalic, without obvious abnormality, atraumatic  Eyes: PERRL, conjunctiva/corneas clear, EOM's intact   Ears: Normal TM's and external ear canals, both ears  Nose:Nares normal, septum midline,mucosa normal, no drainage    Throat:Lips, mucosa, and tongue normal; teeth and gums normal  Neck: Supple, symmetrical, trachea midline, no adenopathy;  thyroid: not enlarged, symmetric, no tenderness/mass/nodules  Back: Symmetric, no curvature, ROM normal,  Lungs: Clear to auscultation bilaterally, respirations unlabored  Breasts: No breast masses, tenderness, asymmetry, or nipple discharge.  Heart: Regular rate and rhythm, S1 and S2 normal, no murmur, rub, or gallop  Abdomen: Soft, non-tender, bowel sounds active all four quadrants,  no masses, no organomegaly  Extremities: Extremities normal, atraumatic, no cyanosis or edema  Skin: Skin color, texture, turgor normal, there are a few irritated appearing ingrown hairs under her armpits bilaterally.  Under her eyes there are patches of slightly erythematous nonraised somewhat scaly skin  Lymph nodes: Cervical, supraclavicular, and axillary nodes normal and   Neurologic: Normal       "

## 2021-06-30 NOTE — PROGRESS NOTES
Progress Notes by Dieudonne Hawkins CNP at 2020 10:59 AM     Author: Dieudonne Hawkins CNP Service: -- Author Type: Nurse Practitioner    Filed: 2020 11:00 AM Encounter Date: 2020 Status: Signed    : Dieudonne Hawkins CNP (Nurse Practitioner)       [facility] Lactation Visit    Assessment:  Mendy Powell is a 2 wk.o. old female infant born at Gestational Age: 40w3d via Vaginal, Spontaneous delivery on 2020 at 9:05 AM here for lactation support.    Mendy Powell is doing well. Mendy Powell has gained 5.3 oz since their last visit 7 days ago; approximately 0.75 oz per day.  She is down -4% from birthweight. She was seen for a lactation visit last week. There was concern about delayed maternal milk supply given mom history of gestational hypertension and it's her first baby. But mother reports milk supply has improved since her last lactation visit. She also has ankyloglossia with a normal Maddison score last week that did not indicate intervention. Infant is also able to draw the gloved finger in her mouth and protrude her tongue over the lower gumline. Discussed possible frenotomy, but mother reports feedings have improved generally and does not feel that she would like a tongue revision for Mendy at this time.    Mendy was able to transfer 1.1 oz from the breast today. Attempted to nurse without the nipple shield, but eventually required a 24 mm nipple shield to sustain a longer latch. She requires supplementation after nursing.     1.  difficulty in feeding at breast         Plan:  Feeding plan: Continue to breast-feed every 2-3 hours or more frequently based on infant cues; at least 8-12 times a day. Don't wait longer than 3 hours for the next feeding. Try to nurse without the nipple shield first. If having difficulty latching, use the nipple shield as needed. When latching Mendy Powell, make sure head, neck, and body are aligned an  "facing you. Support breast with \"sandwich\" hold or \"C\" hold while infant is breast-feeding. To obtain a deeper latch, aim the tip of the nipple to infant's roof of the mouth (aim for the nose). Ensure lips are flanged out. If having difficulty, wait for wide gape and gently apply downward pressure to the infant's chin. If latch is painful or lips are pursed in, unlatch Mendy I [place] and reposition. Make sure to stimulate Mendy Powell to actively nurse. Listen for swallows. If swallows are less frequent, stimulate infant by tickling her hands or feet. You may also wipe a cool wash cloth on her face or hand express your breast for milk. Also skin-to-skin and undressing Mendy Powell down to her diaper can be helpful. Burp Mendy Powell before switching sides and burp again after breast-feeding. Keep Mendy Powell in upright position for about 10-15 minutes after feeding, before laying her flat on her back.    A typical feeding is 10-15 minutes on each side; total of 20-30 minutes per breast-feeding session.    The latch should be like this:      Supplementation: A typical feeding volume at this age is 2-3 oz per feeding. Mendy was able to transfer 1.1 oz from the breast today. Offer 1-2 oz of pumped milk or formula after nursing. You may increase based on her cues.    Age Average milk volume per feeding (mL) Average milk volume per feeding (oz) Average 24 hour milk intake (mL) Average 24 hour milk intake (oz)   Day 1 Few drops - 5mL < tsp Up to 30 mL Up to 1 oz   Day 2 5 - 15 mL <0.5 oz - 1 TB 30 - 120 mL 1 - 4 oz   Day 3 15 - 30 mL  0.5 - 1 oz 120 - 240 mL 4 - 8 oz   Day 4 30 - 45 mL  1 - 1.5 oz 240 - 360 mL 8 - 12 oz   Day 5-7 45 - 60 mL 1.5 - 2 oz 360 - 600 mL 12 - 18 oz   Week 2-3 60 - 90 mL 2 - 3 oz 450 - 750 mL 15 - 25 oz   Months 1-6 90 - 150 mL 3 - 5 oz 750 - 1035 mL 25 - 35 oz      Pumping plan:  Continue to pump as much as you are able after nursing especially if breasts still feel full. Pump as " needed for relief.    Continue to monitor output, expect at least 6 wet diapers per day and 2-4 stools in a day.     Mendy Powell should start Vitamin D 400 internation units, once daily.     Follow up with your primary care provider or with lactation for a weight check in 1 week.    Maternal nipple care:   Best way to help decrease nipple soreness is to obtain a deep latch. When you pump, nipples should not touch the sides of the flange. Apply lanolin or coconut oil after breast-feeding or pumping. Wipe away left over residue before next breast-feeding or pumping. Allow nipples to air dry as much as possible to help stimulate healing. If mother is experiencing persistent breast pain, flu-like symptoms, localized breast tenderness/redness/warmness, or fevers, please contact mother's primary care provider or Obstetrician/midwife for further evaluation.    Return to clinic sooner or call clinic sooner for any worsening symptoms (inconsolability, fever greater than 100.4F, less wet diapers, no stools, sleepiness, difficulty feeding, abdominal distention).    For further lactation concerns, please call [phone].   ____________________________________________________________________  SUBJECTIVE:     Mendy Powell is a 2 wk.o. old female infant born at Gestational Age: 40w3d via Vaginal, Spontaneous delivery on 11/14/2020 at 9:05 AM here for lactation support. This patient is accompanied in the office by her mother.    Concerns: mother with history of gestational hypertension.    Baby is nursing every 3-4 hours for about 14-20 minutes per session. Uses a 24 mm nipple shield at home.  Mother reports hearing audible swallows.   Baby feeds about 8 times in 24 hours and wakes up on own for feeds.  Baby is supplemented with pumped milk and formula, about 1.5-2 oz after feeds (approximately 8 times per day).   Baby has about 6 wet diapers and 5 stools per day. Stools are yellow in color.    Mom is also pumping about 6  per day and gets about 1.5-2 oz per pumping session.     Patient Active Problem List    Diagnosis Date Noted   ? Term , current hospitalization 2020     Results for orders placed or performed during the hospital encounter of 20    Metabolic Screen   Result Value Ref Range    Scan Result See Scanned Report      No current outpatient medications on file.  No past medical history on file.  No past surgical history on file.  Family History   Problem Relation Age of Onset   ? Diabetes Maternal Grandmother         Copied from mother's family history at birth   ? No Medical Problems Maternal Grandfather         Copied from mother's family history at birth       Primary care provider: Jessica Silverman MD    OBJECTIVE:    Mother:   Nipples are everted, the areola is compressible, the breast is soft and full.     Sore nipples: mild redness   Maternal pain: none    Maternal depression screening: Doing well    Infant:   Vitals:    20 0950   Temp: 98  F (36.7  C)   TempSrc: Rectal   Weight: 7 lb 12.4 oz (3.527 kg)       Average weight gain over last 7 days: 5.3 oz (approximately 0.75 oz per day)     Weight:   Birthweight: 8 lb 1 oz (3.657 kg)  Clinic weight on : 7 lbs 7.1 oz  Today's weight:   Vitals:    20 0950   Weight: 7 lb 12.4 oz (3.527 kg)       -4% from birth weight.  Weight after right side feedin lbs 12.8 oz  Weight after left side feedin lbs 13.5 oz  Amount of milk transferred from RIGHT side: 0.4 oz  Amount of milk transferred from LEFT side: 0.7 oz    Total transfer: 1.1 oz    Feeding assessment:     Infant can draw gloved finger into mouth. Infant demonstrated a coordinated suck. Infant palate is intact, tongue over gums, palpable frenulum.   Infant can hold suction with tongue while at the breast for 2-3 minutes. She required a 24 mm nipple shield to sustain a longer latch. Infant did not need frequent stimulation at the breast.     Alignment: Infant's head  "was aligned with its trunk. Infant did face mother. Baby was in cross-cradle position today. Discussed importance of infant ventral positioning to obtain a deeper latch.     Areolar Grasp: Infant was able to open mouth wide.Infant's lips were not pursed. Infant's lips did flange outward. Tongue was visible just barely over bottom lip. Infant had complete seal.     Areolar Compression: Infant made rhythmic motion. There were no clicking or smacking sounds. There was no severe nipple discomfort.  Nipples appeared round after feeding.    Audible swallowing: Infant made quiet sounds of swallowing. There was an increase in frequency after milk ejection reflex.     PHYSICAL EXAM    Gen: Alert, no acute distress.   Head: Anterior and posterior fontanelles are flat and soft.   Eyes: No eye drainage. Sclera clear.   Ears: Pinna appear well-formed. No pits.   Nose: nares patent. No nasal congestion. No nasal flaring.  Mouth: Oral mucosa moist. Tongue midline (tongue elevation adequate when crying, good lateralization). Frenulum palpable and visible. No \"heart-shaped\" tongue. Tongue able to extend pass lower gumline. Lips closed at rest.   Neck: Clavicles intact bilaterally.  Lungs: Clear to auscultation bilaterally.   Cardiac: Regular regular rate and rhythm, S1S2, no murmurs. Brachial and femoral pulses +2 and equal.  Abdomen: Soft, nontender, bowel sounds present, no hepatosplenomegaly or mass palpable. Umbilicus dry with no erythema or drainage.   : Randal stage 1 female genitalia  Skin: Intact. Dry. No rash. No jaundice. Small strawberry colored macule that is raised on the right side of torso and on lower back.  Musculoskeletal: equal movements of upper and lower extremities.  Neuro: Appropriate muscle tone.    The visit lasted a total of 45 minutes that I spent face to face with the patient. Of that time, 40 minutes were spent on lactation. Over 50% of the time was spent counseling and educating the patient about " normal  care and growth, breast-feeding, latching, milk supply.    Completed by:   Dieudonne Hawkins, PRETTY, CPNP, IBCLC  [facility]  [facility]  2020, 9:59 AM

## 2021-06-30 NOTE — PROGRESS NOTES
"Progress Notes by Dieudonne Hawkins CNP at 2020  5:54 PM     Author: Dieudonne Hawkins CNP Service: -- Author Type: Nurse Practitioner    Filed: 2020  5:54 PM Encounter Date: 2020 Status: Signed    : Dieudonne Hawkins CNP (Nurse Practitioner)       [facility] Lactation Visit    Assessment:  Mendy Powell is a 9 days old female infant born at Gestational Age: 40w3d via Vaginal, Spontaneous delivery on 2020 at 9:05 AM here for lactation support.    Mendy Powell is having difficulties with feeding. Mendy Powell has gained 2.1 oz since their last visit 5 days ago; approximately 0.4 oz per day.  She is down -8% from birthweight. She was able to transfer 0.7 oz from the breast today. She required a 20 mm nipple shield to sustain a longer latch. Infant's is voiding and stooling adequately.  She will require supplementation after nursing to support her growth.    She also presents with ankyloglossia.  Maddison score of 4, no intervention required.  We will continue to monitor this at her patient visit.  Concern for delayed milk supply.  This is mother's first baby and she also has history of gestational hypertension.    1.  difficulty in feeding at breast     2. Congenital ankyloglossia         Plan:    Feeding plan: Offer breast first before bottles. Breast-feed every 2-3 hours or more frequently based on infant cues; at least 8-12 times a day. Find a pillow that's firm for good positioning. The pillow we used today is called the brest-friend pillow. Use the nipple shield as needed. Attempt nursing without the nipple shield first. When latching Mendy Powell, make sure head, neck, and body are aligned an facing you. Support breast with \"sandwich\" hold or \"C\" hold while infant is breast-feeding. To obtain a deeper latch, aim the tip of the nipple to infant's roof of the mouth (aim for the nose). Ensure lips are flanged out. If having difficulty, wait " for wide gape and gently apply downward pressure to the infant's chin. If latch is painful or lips are pursed in, unlatch Mendy Powell and reposition. Make sure to stimulate Mendy Powell to actively nurse. Listen for swallows. If swallows are less frequent, stimulate infant by tickling her hands or feet. You may also wipe a cool wash cloth on her face or hand express your breast for milk. Also skin-to-skin and undressing Mendy Powell down to her diaper can be helpful. Burp Mendy Powell before switching sides and burp again after breast-feeding. Keep Mendy Powell in upright position for about 10-15 minutes after feeding, before laying her flat on her back.    A typical feeding is 10-15 minutes on each side; total of 20-30 minutes per breast-feeding session.    The latch should be like this:      Supplementation: a typical feeding volume at this age is 2 oz per feeding. Mendy was able to transfer 0.7 oz from the breast today. She will need to be supplemented with a bottle after nursing until mother's milk increases. Offer 1.-1.5 oz of pumped milk or formula after each breast-feeding. May increase based on her cues.     Age Average milk volume per feeding (mL) Average milk volume per feeding (oz) Average 24 hour milk intake (mL) Average 24 hour milk intake (oz)   Day 1 Few drops - 5mL < tsp Up to 30 mL Up to 1 oz   Day 2 5 - 15 mL <0.5 oz - 1 TB 30 - 120 mL 1 - 4 oz   Day 3 15 - 30 mL  0.5 - 1 oz 120 - 240 mL 4 - 8 oz   Day 4 30 - 45 mL  1 - 1.5 oz 240 - 360 mL 8 - 12 oz   Day 5-7 45 - 60 mL 1.5 - 2 oz 360 - 600 mL 12 - 18 oz   Week 2-3 60 - 90 mL 2 - 3 oz 450 - 750 mL 15 - 25 oz   Months 1-6 90 - 150 mL 3 - 5 oz 750 - 1035 mL 25 - 35 oz      Pumping plan:  In the next few days, pump after nursing for about 10-15 minutes for added stimulation. This will help encourage milk supply and production. You should also try to pump after nursing, if breasts still feel full.     Continue to monitor output, expect at  least 6 wet diapers per day and 2-4 stools in a day.    Mendy Powell should start Vitamin D 400 internation units, once daily, when she is back to birthweight or starts gain weight.    Follow up with lactation in 1 week.    Maternal nipple care:   Best way to help decrease nipple soreness is to obtain a deep latch. When you pump, nipples should not touch the sides of the flange. Apply lanolin or coconut oil after breast-feeding or pumping. Wipe away left over residue before next breast-feeding or pumping. Allow nipples to air dry as much as possible to help stimulate healing. If mother is experiencing persistent breast pain, flu-like symptoms, localized breast tenderness/redness/warmness, or fevers, please contact mother's primary care provider or Obstetrician/midwife for further evaluation.    Return to clinic sooner or call clinic sooner for any worsening symptoms (inconsolability, fever greater than 100.4F, less wet diapers, no stools, sleepiness, difficulty feeding, abdominal distention).    For further lactation concerns, please call [phone].     ____________________________________________________________________  SUBJECTIVE:     Mendy Powell is a 9 days old female infant born at Gestational Age: 40w3d via Vaginal, Spontaneous delivery on 11/14/2020 at 9:05 AM here for lactation support. This patient is accompanied in the office by her mother.     Concerns: difficulty latching. Was seen for a virtual lactation visit last week.    Baby is feeding every 3 hours, mostly bottle-feeding of pumped milk and formula. Baby gets about 1.5-2 oz.     Baby nurses 4-5 times a day. She latches for 5 minutes on each side. No swallows heard.  Mom using an nipple shield.    Baby has about 6 wet diapers and 4 stools per day. Stools are yellow in color.    Mom is also pumping about 7-8 per day and gets about 1-1.5 oz per pumping session. Mom says milk supply increased since virtual visit.    Patient Active Problem List     Diagnosis Date Noted   ? Term , current hospitalization 2020     Results for orders placed or performed during the hospital encounter of 20   [place] Metabolic Screen   Result Value Ref Range    Scan Result See Scanned Report      No current outpatient medications on file.  No past medical history on file.  No past surgical history on file.  Family History   Problem Relation Age of Onset   ? Diabetes Maternal Grandmother         Copied from mother's family history at birth   ? No Medical Problems Maternal Grandfather         Copied from mother's family history at birth       Primary care provider: Jessica Silverman MD    OBJECTIVE:    Mother:   Nipples are everted but short shaft, the areola is compressible, the breast is soft and full.     Sore nipples: None.   Maternal pain: none.    Maternal depression screening: doing well    Infant:   Vitals:    20 1222   Temp: 98.6  F (37  C)   TempSrc: Rectal   Weight: 7 lb 7.1 oz (3.376 kg)       Average weight gain over last 5 days: 2.1 oz (approximately 0.4 oz per day)     Weight:   Birthweight: 8 lb 1 oz (3.657 kg)  Clinic weight on : 7 lbs 5 oz  Today's weight:   Vitals:    20 1222   Weight: 7 lb 7.1 oz (3.376 kg)       -8% from birth weight.    Weight after right side feedin lbs 7.4 oz  Weight after left side feedin lbs 7.8 oz  Amount of milk transferred from RIGHT side: 0.3 oz  Amount of milk transferred from LEFT side: 0.4 oz    Total transfer: 0.7 oz    Feeding assessment:     Infant can draw gloved finger into mouth. Infant demonstrated chomping initially, but eventually chandana gloved finger into mouth with a coordinated suck. Infant palate is intact, tongue over gums, visible and palpable anterior lingual frenulum.  Infant can hold suction with tongue while at the breast for a few sucks and then requires a 20 mm nipple shield. Infant did not need frequent stimulation at the breast.     Alignment: Infant's head was  "aligned with its trunk. Infant did face mother. Baby was in cross-cradle position today. Discussed importance of infant ventral positioning to obtain a deeper latch.     Areolar Grasp: Infant was able to open mouth wide.  Infant's lips were not pursed. Infant's lips did flange outward. Tongue was visible just barely over bottom lip. Infant had complete seal.     Areolar Compression: Infant made rhythmic motion. There were no clicking or smacking sounds. There was no severe nipple discomfort.  Nipples appeared round after feeding.    Audible swallowing: Infant made quiet sounds of swallowing. There was an increase in frequency after milk ejection reflex.     PHYSICAL EXAM    Gen: Alert, no acute distress.   Head: Anterior and posterior fontanelles are flat and soft.   Eyes: No eye drainage. Sclera clear.   Ears: Pinna appear well-formed. No pits.   Nose: nares patent. No nasal congestion. No nasal flaring.  Mouth: Oral mucosa moist. Tongue midline (tongue elevation adequate when crying, good lateralization). Frenulum palpable and visible. No \"heart-shaped\" tongue. Tongue able to extend pass lower gumline. Lips closed at rest.   Neck: Clavicles intact bilaterally.  Lungs: Clear to auscultation bilaterally.   Cardiac: Regular regular rate and rhythm, S1S2, no murmurs. Brachial and femoral pulses +2 and equal.  Abdomen: Soft, nontender, bowel sounds present, no hepatosplenomegaly or mass palpable. Umbilicus dry with no erythema or drainage.   : Randal stage 1 female genitalia  Skin: Intact. Dry. No rash. No jaundice.   Musculoskeletal: equal movements of upper and lower extremities. Negative Ortolani and Ardon maneuver.  Neuro: Appropriate muscle tone.    The visit lasted a total of 55 minutes that I spent face to face with the patient. Of that time, 45 minutes were spent on lactation. Over 50% of the time was spent counseling and educating the patient about normal  care and growth, breast-feeding, latching, " milk supply, nipple shield.    Completed by:   Dieudonne Hawkins, PRETTY, CPNP, IBCLC  [facility]  [facility]  11/23/2020, 12:37 PM

## 2021-06-30 NOTE — PROGRESS NOTES
"Progress Notes by Dieudonne Hawkins CNP at 11/20/2020  3:42 PM     Author: Dieudonne Hawkins CNP Service: -- Author Type: Nurse Practitioner    Filed: 11/20/2020  3:42 PM Encounter Date: 11/20/2020 Status: Signed    : Dieudonne Hawkins CNP (Nurse Practitioner)       Mendy Powell is a 6 days female who is being evaluated via a billable video visit.      The parent/guardian has been notified of following:     \"This video visit will be conducted via a call between you, your child, and your child's physician/provider. We have found that certain health care needs can be provided without the need for an in-person physical exam.  This service lets us provide the care you need with a video conversation.  If a prescription is necessary we can send it directly to your pharmacy.  If lab work is needed we can place an order for that and you can then stop by our lab to have the test done at a later time.    Video visits are billed at different rates depending on your insurance coverage. Please reach out to your insurance provider with any questions.    If during the course of the call the physician/provider feels a video visit is not appropriate, you will not be charged for this service.\"    Parent/guardian has given verbal consent to a Video visit? Yes  How would you like to obtain your AVS? MyChart.  If dropped from the video visit, the Parent/guardian would like the video invitation sent by: Text to cell phone: [phone]  Will anyone else be joining your video visit? No        Video Start Time: 1:03 PM    Additional provider notes:   [facility] Lactation Visit    Assessment:  Mendy Powell is a 6 days old female infant born at Gestational Age: 40w3d via Vaginal, Spontaneous delivery on [birth year]/14/2020 at 9:05 AM here for lactation support.    Mendy Powell is doing well. But having difficulty breast-feeding. A weight could not be obtained today due to nature of visit (virtual). Mom reports " "infant is down 10% from birthweight at their PCP office 3 days ago. She did gain 1 oz about 2 days ago at their home visit. Infant is voiding adequately, but no stool since 2 days ago. She appears well in clinic. Abdominal exam appears normal. Encouraged to do rectal stimulation as recommended by their PCP. Parents shared that they didn't know how to do this. A thermometer was shown during the video and recommended to use vaseline for lubricant.    Infant attempted to breast-feed today, but required a nipple shield to sustain a longer latch. Recommended more nursing time before bottles. Continue supplementation with pumped milk or formula after breast-feeding. Encouraged to monitor output. Follow up closely with Lactation in 3 days.     1.  difficulty in feeding at breast     2. Infrequent  stooling         Plan:    Feeding plan: Continue to breast-feed every 2-3 hours or more frequently based on infant cues; at least 8-12 times a day. Attempt nursing without a nipple shield first. Use the nipple shield as needed. When latching Mendy I Minke, make sure head, neck, and body are aligned an facing you. Support breast with \"sandwich\" hold or \"C\" hold while infant is breast-feeding. To obtain a deeper latch, aim the tip of the nipple to infant's roof of the mouth (aim for the nose). Ensure lips are flanged out. If having difficulty, wait for wide gape and gently apply downward pressure to the infant's chin. If latch is painful or lips are pursed in, unlatch Mendy I Minke and reposition. Make sure to stimulate Mendy I Minke to actively nurse. Listen for swallows. If swallows are less frequent, stimulate infant by tickling her hands or feet. You may also wipe a cool wash cloth on her face or hand express your breast for milk. Also skin-to-skin and undressing Mendy I Minke down to her diaper can be helpful. Burp Mendy I Minke before switching sides and burp again after breast-feeding. Keep Mendy I " Sherry in upright position for about 10-15 minutes after feeding, before laying her flat on her back.    A typical feeding is 10-15 minutes on each side; total of 20-30 minutes per breast-feeding session.    The latch should be like this:      Supplementation: Continue to offer 1.5-2 oz after each breast-feeding. You may increase the volume based on Mendy's cues.    Age Average milk volume per feeding (mL) Average milk volume per feeding (oz) Average 24 hour milk intake (mL) Average 24 hour milk intake (oz)   Day 1 Few drops - 5mL < tsp Up to 30 mL Up to 1 oz   Day 2 5 - 15 mL <0.5 oz - 1 TB 30 - 120 mL 1 - 4 oz   Day 3 15 - 30 mL  0.5 - 1 oz 120 - 240 mL 4 - 8 oz   Day 4 30 - 45 mL  1 - 1.5 oz 240 - 360 mL 8 - 12 oz   Day 5-7 45 - 60 mL 1.5 - 2 oz 360 - 600 mL 12 - 18 oz   Week 2-3 60 - 90 mL 2 - 3 oz 450 - 750 mL 15 - 25 oz   Months 1-6 90 - 150 mL 3 - 5 oz 750 - 1035 mL 25 - 35 oz      Pumping plan:  In the next few days, pump after nursing for about 10-15 minutes for added stimulation. Pump as much as you are able or 6-8 times a day. This will help encourage milk supply and production. Once your milk comes in, you will require less pumping. You should also try to pump after nursing, if breasts still feel full.     Continue to monitor output, expect at least 6 wet diapers per day and 2-4 stools in a day. At this age, Mendy Powell should make 2 wet diapers on day 2 of life, 3 wet diapers on day 3 of life, 4 wet diapers on day 4 of life, 5 wet diapers on day 5 of life, 6-8 wet diapers by 1 week of life. If Mendy Powell has less than the recommended wet diapers, please call us.     Try rectal stimulation today for infrequent stools. Please call our clinic or Mendy's primary care provider if she continues to have no stools and have vomiting after each feeding.    Mendy Powell should start Vitamin D 400 internation units, once daily, when she is back to birthweight or starts gain weight.    Follow up with  lactation in 3 days.    Maternal nipple care:   Best way to help decrease nipple soreness is to obtain a deep latch. When you pump, nipples should not touch the sides of the flange. Apply lanolin or coconut oil after breast-feeding or pumping. Wipe away left over residue before next breast-feeding or pumping. Allow nipples to air dry as much as possible to help stimulate healing. If mother is experiencing persistent breast pain, flu-like symptoms, localized breast tenderness/redness/warmness, or fevers, please contact mother's primary care provider or Obstetrician/midwife for further evaluation.    Return to clinic sooner or call clinic sooner for any worsening symptoms (inconsolability, fever greater than 100.4F, less wet diapers, no stools, sleepiness, difficulty feeding, abdominal distention).    For further lactation concerns, please call [phone].   ____________________________________________________________________  SUBJECTIVE:     Mendy Powell is a 6 days old female infant born at Gestational Age: 40w3d via Vaginal, Spontaneous delivery on [birth year]/14/2020 at 9:05 AM here for lactation support. This patient is accompanied in the office by her mother and father.    Concerns: has been using a nipple shield. She has difficulty latching. Supplementing with formula and breast-milk. She was down 10% on Tuesday of this week and gained an 1 oz the following day. She started supplementation in the hospital.     Baby is nursing every 3 hours for about 1 minutes per session (a couple of sucks)  Mother reports hearing audible swallows.   She is mainly bottle-feeding with pumped milk and formula. She is taking 2 oz every 2.5-3 hours.  Baby has about 4-5 wet diapers and has not stooled since Wednesday morning. Stool was black. Last visit with PCP was Tuesday or Wednesday. Home visit nurse from Wednesday was this week. No concerns of jaundice. Mom notified PCP regarding infrequent stools. Recommended rectal stimulation  tonight.     She has had 3 wet diapers so far today. She is alert and making eye contact in between feeds. One projectile vomiting right away after feeding. She took 2 oz of breast-milk. That was after her last feeding 2 hours ago.    Mom is also pumping about 6 per day and gets about 1 oz per pumping session. Mom noticed her breasts grew larger and areolas darkened during pregnancy and she noticed primary engorgement when her milk came in on day 5.    Breastfeeding Goals: decrease supplement. Exclusively breast-feed.     Previous Breastfeeding Experience: 1st baby.    Breast-surgery: none. No PCOS, thyroid disease. Gestational hypertension. No gestational diabetes.     Maternal medications: prenatal vitamin.  Infant medications: none.     metabolic screening: normal.    Patient Active Problem List    Diagnosis Date Noted   ? Term , current hospitalization 2020     Results for orders placed or performed during the hospital encounter of 20   [place] Metabolic Screen   Result Value Ref Range    Scan Result See Scanned Report      No current outpatient medications on file.  History reviewed. No pertinent past medical history.  History reviewed. No pertinent surgical history.  Family History   Problem Relation Age of Onset   ? Diabetes Maternal Grandmother         Copied from mother's family history at birth   ? No Medical Problems Maternal Grandfather         Copied from mother's family history at birth       Primary care provider: Jessica Silverman MD    OBJECTIVE:    Mother:   Nipples are everted, the areola is compressible, the breast is soft and full.     Sore nipples: none   Maternal pain: none.    Infant:     Feeding assessment:    Infant palate is intact, tongue over gums. Unable to visualize lingual frenulum  Infant can hold suction with tongue while at the breast only for a few sucks. Infant required a nipple shield to sustain a longer latch. Also recommended mom to hand-express  "while infant is latched on.  Infant needed frequent stimulation at the breast.     Alignment: Infant's head was aligned with its trunk. Infant did face mother. Baby was in cross-cradle position today. Discussed importance of infant ventral positioning to obtain a deeper latch.     Areolar Grasp: Infant was able to open mouth wide.  Infant's lips were not pursed. Infant's lips did flange outward. Tongue was visible just barely over bottom lip. Infant had complete seal.     Areolar Compression: Infant made rhythmic motion. There were no clicking or smacking sounds. There was no severe nipple discomfort.  Nipples appeared round after feeding.    Audible swallowing: Infant made quiet sounds of swallowing. There was an increase in frequency after milk ejection reflex.     PHYSICAL EXAM    Gen: Alert, no acute distress.   Head: normocephalic  Eyes: No eye drainage.   Ears: Pinna appear well-formed. No pits.   Nose: nares patent. No nasal congestion. No nasal flaring.  Mouth: Oral mucosa moist. Tongue midline.  No \"heart-shaped\" tongue. Lips closed at rest.   Lungs: Clear to auscultation bilaterally.   Cardiac: Regular regular rate and rhythm, S1S2, no murmurs. Brachial and femoral pulses +2 and equal.  Abdomen: non-distended. Non-tender. Soft. Umbilicus dry with no erythema or drainage.   Skin: Intact. Dry. No rash. No jaundice.   Musculoskeletal: equal movements of upper and lower extremities.   Neuro: Appropriate muscle tone.     Over 50% of the time was spent counseling and educating the patient about normal  care and growth.    Completed by:   Dieudonne Hawkins, APRN, CPNP, IBCLC  [facility]  [facility]  2020, 1:04 PM      Video-Visit Details    Type of service:  Video Visit    Video End Time (time video stopped): 1:47 PM  Originating Location (pt. Location): Home    Distant Location (provider location):  [facility]     Platform used for " Video Visit: [place]

## 2021-10-09 ENCOUNTER — HEALTH MAINTENANCE LETTER (OUTPATIENT)
Age: 36
End: 2021-10-09

## 2022-04-13 ENCOUNTER — OFFICE VISIT (OUTPATIENT)
Dept: FAMILY MEDICINE | Facility: CLINIC | Age: 37
End: 2022-04-13
Payer: COMMERCIAL

## 2022-04-13 VITALS
HEIGHT: 67 IN | WEIGHT: 182.38 LBS | OXYGEN SATURATION: 98 % | TEMPERATURE: 98.2 F | HEART RATE: 82 BPM | DIASTOLIC BLOOD PRESSURE: 72 MMHG | RESPIRATION RATE: 16 BRPM | BODY MASS INDEX: 28.63 KG/M2 | SYSTOLIC BLOOD PRESSURE: 130 MMHG

## 2022-04-13 DIAGNOSIS — Z00.00 HEALTHCARE MAINTENANCE: Primary | ICD-10-CM

## 2022-04-13 DIAGNOSIS — Z30.432 ENCOUNTER FOR IUD REMOVAL: ICD-10-CM

## 2022-04-13 LAB
ANION GAP SERPL CALCULATED.3IONS-SCNC: 7 MMOL/L (ref 3–14)
BUN SERPL-MCNC: 16 MG/DL (ref 7–30)
CALCIUM SERPL-MCNC: 9.1 MG/DL (ref 8.5–10.1)
CHLORIDE BLD-SCNC: 103 MMOL/L (ref 94–109)
CHOLEST SERPL-MCNC: 202 MG/DL
CO2 SERPL-SCNC: 29 MMOL/L (ref 20–32)
CREAT SERPL-MCNC: 0.78 MG/DL (ref 0.52–1.04)
ERYTHROCYTE [DISTWIDTH] IN BLOOD BY AUTOMATED COUNT: 11.9 % (ref 10–15)
FASTING STATUS PATIENT QL REPORTED: ABNORMAL
FASTING STATUS PATIENT QL REPORTED: NORMAL
GFR SERPL CREATININE-BSD FRML MDRD: >90 ML/MIN/1.73M2
GLUCOSE BLD-MCNC: 97 MG/DL (ref 70–99)
GLUCOSE BLD-MCNC: 97 MG/DL (ref 70–99)
HCT VFR BLD AUTO: 43.3 % (ref 35–47)
HDLC SERPL-MCNC: 60 MG/DL
HGB BLD-MCNC: 14.2 G/DL (ref 11.7–15.7)
LDLC SERPL CALC-MCNC: 122 MG/DL
MCH RBC QN AUTO: 30.1 PG (ref 26.5–33)
MCHC RBC AUTO-ENTMCNC: 32.8 G/DL (ref 31.5–36.5)
MCV RBC AUTO: 92 FL (ref 78–100)
NONHDLC SERPL-MCNC: 142 MG/DL
PLATELET # BLD AUTO: 303 10E3/UL (ref 150–450)
POTASSIUM BLD-SCNC: 3.9 MMOL/L (ref 3.4–5.3)
RBC # BLD AUTO: 4.71 10E6/UL (ref 3.8–5.2)
SODIUM SERPL-SCNC: 139 MMOL/L (ref 133–144)
TRIGL SERPL-MCNC: 102 MG/DL
TSH SERPL DL<=0.005 MIU/L-ACNC: 1.59 MU/L (ref 0.4–4)
WBC # BLD AUTO: 5.1 10E3/UL (ref 4–11)

## 2022-04-13 PROCEDURE — 80061 LIPID PANEL: CPT | Performed by: FAMILY MEDICINE

## 2022-04-13 PROCEDURE — 58301 REMOVE INTRAUTERINE DEVICE: CPT | Performed by: FAMILY MEDICINE

## 2022-04-13 PROCEDURE — G0145 SCR C/V CYTO,THINLAYER,RESCR: HCPCS | Performed by: FAMILY MEDICINE

## 2022-04-13 PROCEDURE — 99395 PREV VISIT EST AGE 18-39: CPT | Mod: 25 | Performed by: FAMILY MEDICINE

## 2022-04-13 PROCEDURE — 85027 COMPLETE CBC AUTOMATED: CPT | Performed by: FAMILY MEDICINE

## 2022-04-13 PROCEDURE — 80048 BASIC METABOLIC PNL TOTAL CA: CPT | Performed by: FAMILY MEDICINE

## 2022-04-13 PROCEDURE — 36415 COLL VENOUS BLD VENIPUNCTURE: CPT | Performed by: FAMILY MEDICINE

## 2022-04-13 PROCEDURE — 84443 ASSAY THYROID STIM HORMONE: CPT | Performed by: FAMILY MEDICINE

## 2022-04-13 PROCEDURE — 87624 HPV HI-RISK TYP POOLED RSLT: CPT | Performed by: FAMILY MEDICINE

## 2022-04-13 ASSESSMENT — ENCOUNTER SYMPTOMS
HEMATURIA: 0
SHORTNESS OF BREATH: 0
SORE THROAT: 0
BREAST MASS: 0
HEADACHES: 0
JOINT SWELLING: 0
FEVER: 0
EYE PAIN: 0
CHILLS: 0
FREQUENCY: 0
DYSURIA: 0
COUGH: 0
ARTHRALGIAS: 0
DIZZINESS: 0
PALPITATIONS: 0
NERVOUS/ANXIOUS: 0
WEAKNESS: 0
ABDOMINAL PAIN: 0
MYALGIAS: 0
HEARTBURN: 0
CONSTIPATION: 0
DIARRHEA: 0
HEMATOCHEZIA: 0
PARESTHESIAS: 0
NAUSEA: 0

## 2022-04-13 NOTE — PROGRESS NOTES
"Answers for HPI/ROS submitted by the patient on 4/13/2022  Frequency of exercise:: None  Getting at least 3 servings of Calcium per day:: Yes  Diet:: Regular (no restrictions)  Taking medications regularly:: Yes  Medication side effects:: Not applicable  Bi-annual eye exam:: NO  Dental care twice a year:: Yes  Sleep apnea or symptoms of sleep apnea:: None  abdominal pain: No  Blood in stool: No  Blood in urine: No  chest pain: No  chills: No  congestion: No  constipation: No  cough: No  diarrhea: No  dizziness: No  ear pain: No  eye pain: No  nervous/anxious: No  fever: No  frequency: No  genital sores: No  headaches: No  hearing loss: No  heartburn: No  arthralgias: No  joint swelling: No  peripheral edema: No  mood changes: No  myalgias: No  nausea: No  dysuria: No  palpitations: No  Skin sensation changes: No  sore throat: No  urgency: No  rash: No  shortness of breath: No  visual disturbance: No  weakness: No  pelvic pain: No  vaginal bleeding: No  vaginal discharge: No  tenderness: No  breast mass: No  breast discharge: No  Additional concerns today:: No        Assessment/Plan:     Health maintenance female exam.  All questions answered.  Await pap smear results.  Breast self exam technique reviewed and patient encouraged to perform self-exam monthly.  Discussed healthy lifestyle modifications.  Await fasting lab results    BMI:   Estimated body mass index is 28.48 kg/m  as calculated from the following:    Height as of this encounter: 1.704 m (5' 7.1\").    Weight as of this encounter: 82.7 kg (182 lb 6 oz).   Weight management plan: Discussed healthy diet and exercise guidelines      Healthcare maintenance  Doing very well.  IUD removed without difficulty.  Encouraged her to start taking prenatal vitamin and contact me when she gets a positive regnancy test  - BASIC METABOLIC PANEL  - Lipid panel reflex to direct LDL Non-fasting  - CBC with Platelets  - TSH with free T4 reflex  - Glucose  - Pap screen with HPV " - recommended age 30 - 65 years  - BASIC METABOLIC PANEL  - Lipid panel reflex to direct LDL Non-fasting  - CBC with Platelets  - TSH with free T4 reflex  - Glucose  - HPV Hold (Lab Only)        Patient has been advised of split billing requirements and indicates understanding: Yes      Subjective:     Emeli Powell is a 37 year old female who presents for an annual exam.  She does not have any specific questions or concerns.  She would like her IUD removed today.  She and her  desire another pregnancy.    Last pregnancy was overall uncomplicated other than some elevated blood pressures towards the end of her pregnancy.  She was to be induced but wanted to labor on her own and needed Pitocin augmentation towards the end.        Healthy Habits:   Regular Exercise: Yes  Sunscreen Use: Yes  Healthy Diet: yes  Dental Visits Regularly: yes  Seat Belt: Yes  Self Breast Exam Monthly: yes  Prevention of Osteoporosis: yes    Immunization History   Administered Date(s) Administered     COVID-19,PF,Pfizer (12+ Yrs) 2021, 10/18/2021     HPV Quadrivalent 2011     Influenza Vaccine IM > 6 months Valent IIV4 (Alfuria,Fluzone) 2020     Influenza,INJ,MDCK,PF,Quad >4yrs 2019     MMR 11/15/2020     TDAP Vaccine (Boostrix) 2020         Gynecologic History  No LMP recorded. (Menstrual status: IUD).  Contraception: IUD Mirena  Last Pap: . Results were: normal (previoua abnormals)        OB History    Para Term  AB Living   1 1 1 0 0 1   SAB IAB Ectopic Multiple Live Births   0 0 0 0 1      # Outcome Date GA Lbr Khadar/2nd Weight Sex Delivery Anes PTL Lv   1 Term 20 40w3d 16:30 / 05:35 3.657 kg (8 lb 1 oz) F Vag-Spont EPI N SUDHEER      Name: VASYL,FEMALE-EMELI      Apgar1: 8  Apgar5: 9       No current outpatient medications on file.     Past Medical History:   Diagnosis Date     Cervical high risk HPV (human papillomavirus) test positive          No past surgical history  "on file.  Patient has no known allergies.  Family History   Problem Relation Age of Onset     No Known Problems Mother      No Known Problems Father      Colon Cancer Sister      Diabetes Mother      Heart Disease Paternal Grandmother      Social History     Socioeconomic History     Marital status:      Spouse name: Not on file     Number of children: Not on file     Years of education: Not on file     Highest education level: Not on file   Occupational History     Not on file   Tobacco Use     Smoking status: Never Smoker     Smokeless tobacco: Never Used   Substance and Sexual Activity     Alcohol use: Not on file     Drug use: Not on file     Sexual activity: Not on file   Other Topics Concern     Not on file   Social History Narrative     Not on file     Social Determinants of Health     Financial Resource Strain: Not on file   Food Insecurity: Not on file   Transportation Needs: Not on file   Physical Activity: Not on file   Stress: Not on file   Social Connections: Not on file   Intimate Partner Violence: Not on file   Housing Stability: Not on file       Review of Systems  12 point review of systems was completed and found to be negative except for what is been stated above.      Objective:      Vitals:    04/13/22 1332   BP: 130/72   Pulse: 82   Resp: 16   Temp: 98.2  F (36.8  C)   TempSrc: Tympanic   SpO2: 98%   Weight: 82.7 kg (182 lb 6 oz)   Height: 1.704 m (5' 7.1\")         Physical Exam:  General Appearance: Alert, cooperative, no distress, appears stated age   Head: Normocephalic, without obvious abnormality, atraumatic  Eyes: PERRL, conjunctiva/corneas clear, EOM's intact   Ears: Normal TM's and external ear canals, both ears    Neck: Supple, symmetrical, trachea midline, no adenopathy;  thyroid: not enlarged, symmetric, no tenderness/mass/nodules  Back: Symmetric, no curvature, ROM normal,  Lungs: Clear to auscultation bilaterally, respirations unlabored  Breasts: No breast masses, " tenderness, asymmetry, or nipple discharge.  Heart: Regular rate and rhythm, S1 and S2 normal, no murmur, rub, or gallop  Abdomen: Soft, non-tender, bowel sounds active all four quadrants,  no masses, no organomegaly  Pelvic:normal external female genitalia, normal appearing vaginal mucosa and cervix  Extremities: Extremities normal, atraumatic, no cyanosis or edema  Skin: Skin color, texture, turgor normal, no rashes or lesions  Lymph nodes: Cervical, supraclavicular, and axillary nodes normal and   Neurologic: Normal        IUD removal procedure note    Pre-operative Diagnosis: Desires pregnancy    Post-operative Diagnosis: IUD removed    Indications: I desires pregnancy    Procedure Details   The risks (including infection, bleeding, pain) and benefits of the procedure were explained to the patient and verbal informed consent was obtained.    The patient was laid in the supine position.  A sterile speculum was inserted into the vaginal canal.  The IUD strings were easily visualized, grasped with a ring forceps, and the IUD was gently removed in its entirety.    Condition:  Stable    Complications:  None    Plan:    The patient was advised to call for any fever or for prolonged or severe pain or bleeding. She was advised to use NSAID as needed for mild to moderate pain.

## 2022-04-18 LAB
BKR LAB AP GYN ADEQUACY: NORMAL
BKR LAB AP GYN INTERPRETATION: NORMAL
BKR LAB AP HPV REFLEX: NORMAL
BKR LAB AP PREVIOUS ABNL DX: NORMAL
BKR LAB AP PREVIOUS ABNORMAL: NORMAL
PATH REPORT.COMMENTS IMP SPEC: NORMAL
PATH REPORT.COMMENTS IMP SPEC: NORMAL
PATH REPORT.RELEVANT HX SPEC: NORMAL

## 2022-04-19 LAB
HUMAN PAPILLOMA VIRUS 16 DNA: NEGATIVE
HUMAN PAPILLOMA VIRUS 18 DNA: NEGATIVE
HUMAN PAPILLOMA VIRUS FINAL DIAGNOSIS: NORMAL
HUMAN PAPILLOMA VIRUS OTHER HR: NEGATIVE

## 2022-04-20 ENCOUNTER — PATIENT OUTREACH (OUTPATIENT)
Dept: FAMILY MEDICINE | Facility: CLINIC | Age: 37
End: 2022-04-20
Payer: COMMERCIAL

## 2022-07-10 ENCOUNTER — HEALTH MAINTENANCE LETTER (OUTPATIENT)
Age: 37
End: 2022-07-10

## 2022-09-11 ENCOUNTER — HEALTH MAINTENANCE LETTER (OUTPATIENT)
Age: 37
End: 2022-09-11

## 2022-10-21 ENCOUNTER — OFFICE VISIT (OUTPATIENT)
Dept: FAMILY MEDICINE | Facility: CLINIC | Age: 37
End: 2022-10-21
Payer: COMMERCIAL

## 2022-10-21 VITALS
HEART RATE: 80 BPM | TEMPERATURE: 99 F | BODY MASS INDEX: 28.58 KG/M2 | WEIGHT: 183 LBS | SYSTOLIC BLOOD PRESSURE: 124 MMHG | DIASTOLIC BLOOD PRESSURE: 72 MMHG

## 2022-10-21 DIAGNOSIS — Z32.00 PREGNANCY EXAMINATION OR TEST, PREGNANCY UNCONFIRMED: Primary | ICD-10-CM

## 2022-10-21 LAB — HCG UR QL: POSITIVE

## 2022-10-21 PROCEDURE — 99214 OFFICE O/P EST MOD 30 MIN: CPT | Performed by: FAMILY MEDICINE

## 2022-10-21 PROCEDURE — 81025 URINE PREGNANCY TEST: CPT | Performed by: FAMILY MEDICINE

## 2022-10-21 RX ORDER — FOLIC ACID 0.8 MG
800 TABLET ORAL DAILY
Qty: 90 TABLET | Refills: 0 | Status: SHIPPED | OUTPATIENT
Start: 2022-10-21 | End: 2023-03-17

## 2022-10-21 NOTE — PATIENT INSTRUCTIONS
Pregnancy Information    We will see you every 4 weeks until 32 weeks, every 2 weeks until 36 weeks and every week until delivery    [facility] for delivery - #[phone]     US for dates at around 8-10 weeks   [facility] - [phone]  [facility] - [phone]    Testing for trisomies (downs syndrome, trisomy 13 and trisomy 18) -    - US testing and bloodwork at [facility] ([place]) at around 13 weeks (no later than 13w6d)  OR   - [facility] genetic testing done any time after 10 weeks ($99)    US for gender and fetal survey at about 20 weeks    Blood sugar test between 24 and 28 weeks    Continue to take a prenatal vitamin - I recommend one with 800 mcg or folic acid, 27mg of elemental iron and 150 mcg of iodine     I also recommend shooting for 1,000-1,300 mg/day of Calcium (either through vitamin or diet)    Nausea in pregnancy:  I would recommend vitamin B6 for nausea.  Dosing as follows:  -Pyridoxine (B6) - 25 mg orally every six to eight hours; the maximum treatment dose suggested for pregnant women is 200 mg/day  -You can also add on Unisom (Doxyamine) - 20mg at night and up to two 10 mg doses throughout the day (for a total of up to 40mg/day) although it will likely make you drowsy

## 2022-10-21 NOTE — PROGRESS NOTES
Assessment/Plan:     Emeli Powell is a 37 year old female presenting for:     Pregnancy confirmation: pregnancy test is positive today. Congratulations was given.     She is approximately 8 weeks 0 days gestation with an ITALIA of 6/2/23.   We went over the complete prenatal packet in its entirety.   We discussed lifestyle modifications, safe medications in pregnancy, and foods to avoid.  Discussed the importance of a prenatal vitamin.      - We discussed trisomy testing today and she will consider.      - Early US was ordered   - She will follow up with me at 10 to 12 weeks for her first OB appointment.    I did send folic acid to the pharmacy for her that she can take in lieu of a prenatal vitamin until she is able to tolerate the prenatal.    There are no discontinued medications.     I personally spent over 35 minutes performing care related to this patient on the day of the patient visit.  This includes chart review, precharting, talking with/ examining the patient as well as completing after visit documentation.      Chief Complaint:  chief complaint      Subjective:     Emeli Powell is a 37 year old female who is a to our clinic presenting for a pregnancy confirmation appointment.  Her LMP started on 8/26/22 placing her due date on 6/2/23.    Her previously pregnancy was overall uncomplicated.  She had a few high blood pressures towards the end of pregnancy and 1 blood pressure in labor that needed antihypertensive treatment.    Otherwise, she has been feeling overall well.  A bit nauseated.  Vomiting once or twice.  Prenatal vitamin causes nausea..    Review of Systems - Negative except as above    Medications: reviewed -   Current Outpatient Medications   Medication     folic acid 800 MCG tablet     No current facility-administered medications for this visit.       Past medical history: reviewed -   Past Medical History:   Diagnosis Date     Cervical high risk HPV (human papillomavirus) test  positive     2020       Past surgical history: reviewed - History reviewed. No pertinent surgical history.    Family history: reviewed -   Family History   Problem Relation Age of Onset     No Known Problems Mother      No Known Problems Father      Colon Cancer Sister      Diabetes Mother      Heart Disease Paternal Grandmother        Social history: reviewed -   Social History     Socioeconomic History     Marital status:      Spouse name: None     Number of children: None     Years of education: None     Highest education level: None   Tobacco Use     Smoking status: Never     Smokeless tobacco: Never   Vaping Use     Vaping Use: Never used       Objective:  /72   Pulse 80   Temp 99  F (37.2  C) (Tympanic)   Wt 83 kg (183 lb)   LMP 08/23/2022 (Exact Date)   BMI 28.58 kg/m      Vital signs reviewed and stable  General: No acute distress  Psych: Appropriate affect  HEENT: moist mucous membranes, pupils equal, round, reactive to light and accomodation, posterior oropharynx clear of erythema or exudate, tympanic membranes are pearly grey bilaterally  Lymph: no cervical or supraclavicular lymphadenopathy  Cardiovascular: regular rate and rhythm with no murmur  Pulmonary: clear to auscultation bilaterally with no wheeze  Abdomen: soft, non tender, non distended with normo-active bowel sounds  Extremities: warm and well perfused with no edema  Skin: warm and dry with no rash      Bedside ultrasound: Bedside ultrasound shows single intrauterine gestation positive for heartbeat estimated visually to be around 150 bpm  Answers for HPI/ROS submitted by the patient on 10/21/2022  What is the reason for your visit today? : pregnancy  How many servings of fruits and vegetables do you eat daily?: 2-3  On average, how many sweetened beverages do you drink each day (Examples: soda, juice, sweet tea, etc.  Do NOT count diet or artificially sweetened beverages)?: 0  How many minutes a day do you exercise enough to  make your heart beat faster?: 10 to 19  How many days a week do you exercise enough to make your heart beat faster?: 3 or less  How many days per week do you miss taking your medication?: 0

## 2022-10-24 ENCOUNTER — HOSPITAL ENCOUNTER (OUTPATIENT)
Dept: ULTRASOUND IMAGING | Facility: HOSPITAL | Age: 37
Discharge: HOME | End: 2022-10-24
Attending: FAMILY MEDICINE | Admitting: FAMILY MEDICINE
Payer: COMMERCIAL

## 2022-10-24 DIAGNOSIS — Z32.00 PREGNANCY EXAMINATION OR TEST, PREGNANCY UNCONFIRMED: ICD-10-CM

## 2022-10-24 PROCEDURE — 76801 OB US < 14 WKS SINGLE FETUS: CPT

## 2022-11-08 LAB
ABO/RH(D): NORMAL
ANTIBODY SCREEN: NEGATIVE
SPECIMEN EXPIRATION DATE: NORMAL

## 2022-11-09 ENCOUNTER — PRENATAL OFFICE VISIT (OUTPATIENT)
Dept: FAMILY MEDICINE | Facility: CLINIC | Age: 37
End: 2022-11-09
Payer: COMMERCIAL

## 2022-11-09 VITALS
RESPIRATION RATE: 16 BRPM | OXYGEN SATURATION: 99 % | TEMPERATURE: 98.8 F | HEIGHT: 67 IN | WEIGHT: 183.13 LBS | SYSTOLIC BLOOD PRESSURE: 122 MMHG | DIASTOLIC BLOOD PRESSURE: 68 MMHG | HEART RATE: 82 BPM | BODY MASS INDEX: 28.74 KG/M2

## 2022-11-09 DIAGNOSIS — Z34.81 ENCOUNTER FOR SUPERVISION OF OTHER NORMAL PREGNANCY IN FIRST TRIMESTER: Primary | ICD-10-CM

## 2022-11-09 LAB
ALBUMIN UR-MCNC: NEGATIVE MG/DL
APPEARANCE UR: CLEAR
BILIRUB UR QL STRIP: NEGATIVE
COLOR UR AUTO: YELLOW
ERYTHROCYTE [DISTWIDTH] IN BLOOD BY AUTOMATED COUNT: 12 % (ref 10–15)
GLUCOSE UR STRIP-MCNC: NEGATIVE MG/DL
HBV SURFACE AG SERPL QL IA: NONREACTIVE
HCT VFR BLD AUTO: 39.7 % (ref 35–47)
HGB BLD-MCNC: 13.5 G/DL (ref 11.7–15.7)
HGB UR QL STRIP: NEGATIVE
HIV 1+2 AB+HIV1 P24 AG SERPL QL IA: NONREACTIVE
KETONES UR STRIP-MCNC: NEGATIVE MG/DL
LEUKOCYTE ESTERASE UR QL STRIP: ABNORMAL
MCH RBC QN AUTO: 30.9 PG (ref 26.5–33)
MCHC RBC AUTO-ENTMCNC: 34 G/DL (ref 31.5–36.5)
MCV RBC AUTO: 91 FL (ref 78–100)
NITRATE UR QL: NEGATIVE
PH UR STRIP: 6 [PH] (ref 5–7)
PLATELET # BLD AUTO: 278 10E3/UL (ref 150–450)
RBC # BLD AUTO: 4.37 10E6/UL (ref 3.8–5.2)
SP GR UR STRIP: >=1.03 (ref 1–1.03)
T PALLIDUM AB SER QL: NONREACTIVE
UROBILINOGEN UR STRIP-ACNC: 0.2 E.U./DL
WBC # BLD AUTO: 5.8 10E3/UL (ref 4–11)

## 2022-11-09 PROCEDURE — 86850 RBC ANTIBODY SCREEN: CPT | Performed by: FAMILY MEDICINE

## 2022-11-09 PROCEDURE — 99207 PR FIRST OB VISIT: CPT | Performed by: FAMILY MEDICINE

## 2022-11-09 PROCEDURE — 85027 COMPLETE CBC AUTOMATED: CPT | Performed by: FAMILY MEDICINE

## 2022-11-09 PROCEDURE — 86762 RUBELLA ANTIBODY: CPT | Performed by: FAMILY MEDICINE

## 2022-11-09 PROCEDURE — 86901 BLOOD TYPING SEROLOGIC RH(D): CPT | Performed by: FAMILY MEDICINE

## 2022-11-09 PROCEDURE — 87389 HIV-1 AG W/HIV-1&-2 AB AG IA: CPT | Performed by: FAMILY MEDICINE

## 2022-11-09 PROCEDURE — 81003 URINALYSIS AUTO W/O SCOPE: CPT | Performed by: FAMILY MEDICINE

## 2022-11-09 PROCEDURE — 86780 TREPONEMA PALLIDUM: CPT | Performed by: FAMILY MEDICINE

## 2022-11-09 PROCEDURE — 36415 COLL VENOUS BLD VENIPUNCTURE: CPT | Performed by: FAMILY MEDICINE

## 2022-11-09 PROCEDURE — 86900 BLOOD TYPING SEROLOGIC ABO: CPT | Performed by: FAMILY MEDICINE

## 2022-11-09 PROCEDURE — 87340 HEPATITIS B SURFACE AG IA: CPT | Performed by: FAMILY MEDICINE

## 2022-11-09 NOTE — PROGRESS NOTES
"Assessment/Plan:    Emeli Powell is a 37 year old female presenting for:    Encounter for supervision of other normal pregnancy in first trimester  She continues to have some nausea and intermittent vomiting but is otherwise feeling better.  - ABO/Rh type and screen  - CBC with platelets  - HIV Antigen Antibody Combo  - Rubella Antibody IgG  - Hepatitis B surface antigen  - Treponema Abs w Reflex to RPR and Titer  - Urinalysis Macroscopic      There are no discontinued medications.        Chief Complaint:  Prenatal Care        Subjective:   Emeli Powell is a very pleasant 37-year-old female who presents to the clinic today for her first OB appointment.    Based on her LMP (which was confirmed by an early ultrasound) she is about 10 weeks and 5 days with an estimated due date of June 2.  She is no specific questions or concerns.  Continues to have some nausea but thinks that it may be slightly improving.        12 point review of systems completed and negative except for what has been described above    History   Smoking Status     Never   Smokeless Tobacco     Never         Current Outpatient Medications:      folic acid 800 MCG tablet, Take 1 tablet (800 mcg) by mouth daily, Disp: 90 tablet, Rfl: 0      Objective:  Vitals:    11/09/22 1126   BP: 122/68   Pulse: 82   Resp: 16   Temp: 98.8  F (37.1  C)   TempSrc: Tympanic   SpO2: 99%   Weight: 83.1 kg (183 lb 2 oz)   Height: 1.704 m (5' 7.1\")       Body mass index is 28.6 kg/m .    Vital signs reviewed and stable  General: No acute distress  Psych: Appropriate affect  HEENT: moist mucous membranes, pupils equal, round, reactive to light and accomodation, tympanic membranes are pearly grey bilaterally  Lymph: no cervical or supraclavicular lymphadenopathy  Cardiovascular: regular rate and rhythm with no murmur  Pulmonary: clear to auscultation bilaterally with no wheeze  Abdomen: soft, non tender, non distended with normo-active bowel " sounds  Extremities: warm and well perfused with no edema  Skin: warm and dry with no rash    Bedside ultrasound: Bedside ultrasound was done which showed single intrauterine gestation with estimated fetal heart rate of 155 bpm.     This note has been dictated and transcribed using voice recognition software.   Any errors in transcription are unintentional and inherent to the software.

## 2022-11-10 LAB
RUBV IGG SERPL QL IA: 15.1 INDEX
RUBV IGG SERPL QL IA: POSITIVE

## 2022-12-07 ENCOUNTER — PRENATAL OFFICE VISIT (OUTPATIENT)
Dept: FAMILY MEDICINE | Facility: CLINIC | Age: 37
End: 2022-12-07
Payer: COMMERCIAL

## 2022-12-07 VITALS — BODY MASS INDEX: 29.04 KG/M2 | WEIGHT: 186 LBS | DIASTOLIC BLOOD PRESSURE: 60 MMHG | SYSTOLIC BLOOD PRESSURE: 118 MMHG

## 2022-12-07 DIAGNOSIS — Z34.82 ENCOUNTER FOR SUPERVISION OF OTHER NORMAL PREGNANCY IN SECOND TRIMESTER: Primary | ICD-10-CM

## 2022-12-07 PROCEDURE — 99207 PR PRENATAL VISIT: CPT | Performed by: FAMILY MEDICINE

## 2022-12-07 NOTE — PROGRESS NOTES
Emeli is a very pleasant 37-year-old  presenting to the clinic today for prenatal care and a normal subsequent pregnancy in the second trimester.    She is feeling well.  Still having occasional nausea but it has improved and is only occasionally.    No fetal movement yet.  No questions or concerns.  Referral given for 20-week ultrasound.  She will follow-up with me in about a month.    Patient declined influenza vaccination today.

## 2023-01-11 ENCOUNTER — HOSPITAL ENCOUNTER (OUTPATIENT)
Dept: ULTRASOUND IMAGING | Facility: HOSPITAL | Age: 38
Discharge: HOME | End: 2023-01-11
Attending: FAMILY MEDICINE | Admitting: FAMILY MEDICINE
Payer: COMMERCIAL

## 2023-01-11 DIAGNOSIS — Z34.82 ENCOUNTER FOR SUPERVISION OF OTHER NORMAL PREGNANCY IN SECOND TRIMESTER: ICD-10-CM

## 2023-01-11 PROCEDURE — 76805 OB US >/= 14 WKS SNGL FETUS: CPT

## 2023-01-13 ENCOUNTER — PRENATAL OFFICE VISIT (OUTPATIENT)
Dept: FAMILY MEDICINE | Facility: CLINIC | Age: 38
End: 2023-01-13
Payer: COMMERCIAL

## 2023-01-13 VITALS
WEIGHT: 193.13 LBS | SYSTOLIC BLOOD PRESSURE: 128 MMHG | DIASTOLIC BLOOD PRESSURE: 60 MMHG | BODY MASS INDEX: 30.16 KG/M2

## 2023-01-13 DIAGNOSIS — O43.122 VELAMENTOUS INSERTION OF UMBILICAL CORD IN SECOND TRIMESTER: Primary | ICD-10-CM

## 2023-01-13 PROCEDURE — 99207 PR PRENATAL VISIT: CPT | Performed by: FAMILY MEDICINE

## 2023-01-15 NOTE — PROGRESS NOTES
Emeli is a very pleasant  who presents to the clinic today at 20 weeks and 0 days gestation for prenatal care in a normal subsequent pregnancy in the second trimester.    She had her 20-week ultrasound a few days ago.  Growth was normal.  The limits condition noted.  Discussed this with patient.  Discussed doing growth ultrasounds every 4 to 6 weeks and order was given to get that done.  Discussed inducing by 40 weeks.    Otherwise, discussed following up in 4 weeks for her 1 hour blood sugar testing.

## 2023-02-10 ENCOUNTER — PRENATAL OFFICE VISIT (OUTPATIENT)
Dept: FAMILY MEDICINE | Facility: CLINIC | Age: 38
End: 2023-02-10
Payer: COMMERCIAL

## 2023-02-10 VITALS
OXYGEN SATURATION: 99 % | SYSTOLIC BLOOD PRESSURE: 124 MMHG | DIASTOLIC BLOOD PRESSURE: 50 MMHG | HEART RATE: 95 BPM | BODY MASS INDEX: 31.92 KG/M2 | TEMPERATURE: 98.6 F | WEIGHT: 204.4 LBS

## 2023-02-10 DIAGNOSIS — Z34.82 ENCOUNTER FOR SUPERVISION OF OTHER NORMAL PREGNANCY IN SECOND TRIMESTER: Primary | ICD-10-CM

## 2023-02-10 DIAGNOSIS — Z00.00 HEALTHCARE MAINTENANCE: ICD-10-CM

## 2023-02-10 LAB
CHOLEST SERPL-MCNC: 246 MG/DL
ERYTHROCYTE [DISTWIDTH] IN BLOOD BY AUTOMATED COUNT: 12.3 % (ref 10–15)
GLUCOSE 1H P 50 G GLC PO SERPL-MCNC: 96 MG/DL (ref 70–129)
HCT VFR BLD AUTO: 35.7 % (ref 35–47)
HDLC SERPL-MCNC: 93 MG/DL
HGB BLD-MCNC: 11.7 G/DL (ref 11.7–15.7)
LDLC SERPL CALC-MCNC: 132 MG/DL
MCH RBC QN AUTO: 31 PG (ref 26.5–33)
MCHC RBC AUTO-ENTMCNC: 32.8 G/DL (ref 31.5–36.5)
MCV RBC AUTO: 94 FL (ref 78–100)
NONHDLC SERPL-MCNC: 153 MG/DL
PLATELET # BLD AUTO: 255 10E3/UL (ref 150–450)
RBC # BLD AUTO: 3.78 10E6/UL (ref 3.8–5.2)
TRIGL SERPL-MCNC: 105 MG/DL
WBC # BLD AUTO: 7.5 10E3/UL (ref 4–11)

## 2023-02-10 PROCEDURE — 36415 COLL VENOUS BLD VENIPUNCTURE: CPT | Performed by: FAMILY MEDICINE

## 2023-02-10 PROCEDURE — 82950 GLUCOSE TEST: CPT | Performed by: FAMILY MEDICINE

## 2023-02-10 PROCEDURE — 85027 COMPLETE CBC AUTOMATED: CPT | Performed by: FAMILY MEDICINE

## 2023-02-10 PROCEDURE — 99207 PR PRENATAL VISIT: CPT | Performed by: FAMILY MEDICINE

## 2023-02-10 PROCEDURE — 86780 TREPONEMA PALLIDUM: CPT | Performed by: FAMILY MEDICINE

## 2023-02-10 PROCEDURE — 80061 LIPID PANEL: CPT | Performed by: FAMILY MEDICINE

## 2023-02-10 NOTE — PROGRESS NOTES
{PROVIDER CHARTING PREFERENCE:797582}    Nilsa Sainz is a 37 year old{ACCOMPANIED BY STATEMENT (Optional):352965}, presenting for the following health issues:  Prenatal Care    24w0d    HPI     {SUPERLIST (Optional):082121}  {additonal problems for provider to add (Optional):486169}    Review of Systems   {ROS COMP (Optional):419120}      Objective    LMP 08/26/2022   There is no height or weight on file to calculate BMI.  Physical Exam   {Exam List (Optional):277416}    {Diagnostic Test Results (Optional):209086}    {AMBULATORY ATTESTATION (Optional):009884}

## 2023-02-11 LAB — T PALLIDUM AB SER QL: NONREACTIVE

## 2023-02-12 NOTE — PROGRESS NOTES
Emeli is a very pleasant 37-year-old G2, P1 who presents to the clinic today for prenatal care and a normal subsequent pregnancy in second trimester.  Currently 24 weeks gestation.  Passed 1 hour blood sugar testing today.    Patient has a velamentous cord insertion and will getting a repeat ultrasound next week.  Discussed growth ultrasounds every 4 to 6 weeks.  No further questions or concerns.  We will follow-up with her after that ultrasound returns.

## 2023-02-13 ENCOUNTER — HOSPITAL ENCOUNTER (OUTPATIENT)
Dept: ULTRASOUND IMAGING | Facility: HOSPITAL | Age: 38
Discharge: HOME | End: 2023-02-13
Attending: FAMILY MEDICINE | Admitting: FAMILY MEDICINE
Payer: COMMERCIAL

## 2023-02-13 DIAGNOSIS — O43.122 VELAMENTOUS INSERTION OF UMBILICAL CORD IN SECOND TRIMESTER: ICD-10-CM

## 2023-02-13 PROCEDURE — 76816 OB US FOLLOW-UP PER FETUS: CPT

## 2023-03-13 ENCOUNTER — HOSPITAL ENCOUNTER (OUTPATIENT)
Dept: ULTRASOUND IMAGING | Facility: HOSPITAL | Age: 38
Discharge: HOME | End: 2023-03-13
Attending: FAMILY MEDICINE | Admitting: FAMILY MEDICINE
Payer: COMMERCIAL

## 2023-03-13 DIAGNOSIS — Z34.82 ENCOUNTER FOR SUPERVISION OF OTHER NORMAL PREGNANCY IN SECOND TRIMESTER: ICD-10-CM

## 2023-03-13 PROCEDURE — 76816 OB US FOLLOW-UP PER FETUS: CPT

## 2023-03-17 ENCOUNTER — PRENATAL OFFICE VISIT (OUTPATIENT)
Dept: FAMILY MEDICINE | Facility: CLINIC | Age: 38
End: 2023-03-17
Payer: COMMERCIAL

## 2023-03-17 VITALS
DIASTOLIC BLOOD PRESSURE: 62 MMHG | BODY MASS INDEX: 33.63 KG/M2 | WEIGHT: 215.38 LBS | SYSTOLIC BLOOD PRESSURE: 118 MMHG

## 2023-03-17 DIAGNOSIS — Z34.83 ENCOUNTER FOR SUPERVISION OF OTHER NORMAL PREGNANCY IN THIRD TRIMESTER: Primary | ICD-10-CM

## 2023-03-17 PROCEDURE — 99207 PR PRENATAL VISIT: CPT | Performed by: FAMILY MEDICINE

## 2023-03-17 NOTE — PROGRESS NOTES
Emeli is a very pleasant 37-year-old G2, P1 who presents to clinic today for prenatal care and a normal subsequent pregnancy in the third trimester.  She is overall doing well.      She has been having growth ultrasound due to a velamentous cord insertion.  Most recent ultrasound was this past week and was normal growth.  We will follow-up in mid to end of April.    Having some leg cramps discussed magnesium supplementation.

## 2023-03-29 ENCOUNTER — PATIENT OUTREACH (OUTPATIENT)
Dept: FAMILY MEDICINE | Facility: CLINIC | Age: 38
End: 2023-03-29
Payer: COMMERCIAL

## 2023-03-29 DIAGNOSIS — D06.9 CIN III WITH SEVERE DYSPLASIA: ICD-10-CM

## 2023-03-29 NOTE — TELEPHONE ENCOUNTER
Hi Dr Silverman,    This pregnant patient is due for a cotest. Recent normal hx but had positive HPV in 2020 and hx NERIS 2 & 3 in 2006. She is currently 30w5d pregnant. Please advise if you would like to complete the cotest at an upcoming prenatal visit or push to the PP period. Estimated Date of Delivery: Jun 2, 2023. Thanks!    Kiara Hernandez RN BSN, Pap Tracking

## 2023-03-29 NOTE — TELEPHONE ENCOUNTER
Copied note from provider    Jessica Silverman MD  You 7 minutes ago (12:39 PM)     EB  I think post partum would be best as we would not do anything about an abnormal result in pregnancy anyway - thanks!     Elisha

## 2023-04-03 ENCOUNTER — PRENATAL OFFICE VISIT (OUTPATIENT)
Dept: FAMILY MEDICINE | Facility: CLINIC | Age: 38
End: 2023-04-03
Payer: COMMERCIAL

## 2023-04-03 VITALS
WEIGHT: 219.13 LBS | DIASTOLIC BLOOD PRESSURE: 60 MMHG | SYSTOLIC BLOOD PRESSURE: 122 MMHG | BODY MASS INDEX: 34.22 KG/M2

## 2023-04-03 DIAGNOSIS — Z34.83 ENCOUNTER FOR SUPERVISION OF OTHER NORMAL PREGNANCY IN THIRD TRIMESTER: ICD-10-CM

## 2023-04-03 DIAGNOSIS — O43.123 VELAMENTOUS INSERTION OF UMBILICAL CORD IN THIRD TRIMESTER: Primary | ICD-10-CM

## 2023-04-03 PROCEDURE — 90715 TDAP VACCINE 7 YRS/> IM: CPT | Performed by: FAMILY MEDICINE

## 2023-04-03 PROCEDURE — 99207 PR PRENATAL VISIT: CPT | Performed by: FAMILY MEDICINE

## 2023-04-03 PROCEDURE — 90471 IMMUNIZATION ADMIN: CPT | Performed by: FAMILY MEDICINE

## 2023-04-03 NOTE — PROGRESS NOTES
Emeli is a very pleasant 37-year-old G2, P1 who presents to the clinic today for prenatal care and a normal subsequent pregnancy in the third trimester.  She is doing well overall.    She had an event in [place] this past weekend where she was on her feet for several days and well in the 85 degree heat.  She did notice some lower extremity swelling that has overall gotten better.    In her last pregnancy she had blood pressures that began to become elevated around 36 to 37 weeks.  She ended up going into labor on her own and delivered slightly before her due date.    She had 1 blood pressure that needed treatment in the hospital.  No preeclampsia.    In the pregnancy she has had a velamentous cord insertion.  She has had normal ultrasounds for growth thus far.  We will repeat the ultrasound later this month.

## 2023-04-14 ENCOUNTER — PRENATAL OFFICE VISIT (OUTPATIENT)
Dept: FAMILY MEDICINE | Facility: CLINIC | Age: 38
End: 2023-04-14
Payer: COMMERCIAL

## 2023-04-14 VITALS
WEIGHT: 221.44 LBS | OXYGEN SATURATION: 98 % | SYSTOLIC BLOOD PRESSURE: 118 MMHG | HEART RATE: 77 BPM | DIASTOLIC BLOOD PRESSURE: 76 MMHG | BODY MASS INDEX: 33.56 KG/M2 | HEIGHT: 68 IN | RESPIRATION RATE: 16 BRPM | TEMPERATURE: 97 F

## 2023-04-14 DIAGNOSIS — O43.123 VELAMENTOUS INSERTION OF UMBILICAL CORD IN THIRD TRIMESTER: ICD-10-CM

## 2023-04-14 DIAGNOSIS — Z34.83 ENCOUNTER FOR SUPERVISION OF OTHER NORMAL PREGNANCY IN THIRD TRIMESTER: Primary | ICD-10-CM

## 2023-04-14 PROCEDURE — 99207 PR PRENATAL VISIT: CPT | Performed by: FAMILY MEDICINE

## 2023-04-14 ASSESSMENT — PAIN SCALES - GENERAL: PAINLEVEL: NO PAIN (0)

## 2023-04-14 NOTE — PROGRESS NOTES
Emeli is a very pleasant 37-year-old G2, P1 who presents to the clinic today for prenatal care and a normal subsequent pregnancy in the third trimester.  She is doing well overall.    She has not had any issues in the last few weeks.  She is going to [place] this weekend for work event but then will be done traveling.    In her last pregnancy she had blood pressures that began to become elevated at 36 to 37 weeks but did not need treatment.  She delivered slightly before her due date after going into labor spontaneously.    No preeclampsia.  1 blood pressure in the hospital that needed treatment.    In the pregnancy she has had a velamentous cord insertion.  She has had normal ultrasounds for growth thus far.  We will repeat the ultrasound later this month.

## 2023-04-26 ENCOUNTER — HOSPITAL ENCOUNTER (OUTPATIENT)
Dept: ULTRASOUND IMAGING | Facility: HOSPITAL | Age: 38
Discharge: HOME | End: 2023-04-26
Attending: FAMILY MEDICINE | Admitting: FAMILY MEDICINE
Payer: COMMERCIAL

## 2023-04-26 DIAGNOSIS — O43.123 VELAMENTOUS INSERTION OF UMBILICAL CORD IN THIRD TRIMESTER: ICD-10-CM

## 2023-04-26 PROCEDURE — 76816 OB US FOLLOW-UP PER FETUS: CPT

## 2023-04-28 ENCOUNTER — PRENATAL OFFICE VISIT (OUTPATIENT)
Dept: FAMILY MEDICINE | Facility: CLINIC | Age: 38
End: 2023-04-28
Payer: COMMERCIAL

## 2023-04-28 VITALS — BODY MASS INDEX: 34.14 KG/M2 | WEIGHT: 224.5 LBS | DIASTOLIC BLOOD PRESSURE: 68 MMHG | SYSTOLIC BLOOD PRESSURE: 120 MMHG

## 2023-04-28 DIAGNOSIS — Z34.83 ENCOUNTER FOR SUPERVISION OF OTHER NORMAL PREGNANCY IN THIRD TRIMESTER: Primary | ICD-10-CM

## 2023-04-28 PROCEDURE — 99207 PR PRENATAL VISIT: CPT | Performed by: FAMILY MEDICINE

## 2023-04-28 PROCEDURE — 87653 STREP B DNA AMP PROBE: CPT | Performed by: FAMILY MEDICINE

## 2023-04-28 NOTE — PROGRESS NOTES
Emeli is a very pleasant 38-year-old G2, P1 who presents to the clinic today for prenatal care and a normal subsequent pregnancy in the third trimester.  Doing well overall.    Velamentous cord insertion with a recent growth ultrasound that was normal.    Previous pregnancy with slightly elevated blood pressures at 36 to 37 weeks.  Continue to monitor closely.  Group B strep test today.    Declined cervical check today but will consider doing at the next visit or 2.

## 2023-04-29 LAB — GP B STREP DNA SPEC QL NAA+PROBE: NEGATIVE

## 2023-05-05 ENCOUNTER — PRENATAL OFFICE VISIT (OUTPATIENT)
Dept: FAMILY MEDICINE | Facility: CLINIC | Age: 38
End: 2023-05-05
Payer: COMMERCIAL

## 2023-05-05 VITALS — WEIGHT: 225 LBS | DIASTOLIC BLOOD PRESSURE: 66 MMHG | BODY MASS INDEX: 34.21 KG/M2 | SYSTOLIC BLOOD PRESSURE: 124 MMHG

## 2023-05-05 DIAGNOSIS — Z34.83 ENCOUNTER FOR SUPERVISION OF OTHER NORMAL PREGNANCY IN THIRD TRIMESTER: Primary | ICD-10-CM

## 2023-05-05 PROCEDURE — 99207 PR PRENATAL VISIT: CPT | Performed by: FAMILY MEDICINE

## 2023-05-05 NOTE — PROGRESS NOTES
Emeli is a very pleasant 38-year-old G2, P1 who presents to clinic today for prenatal care and a normal subsequent pregnancy in the third trimester.  She is overall doing very well.  Cord insertion with a recent growth ultrasound which was normal.    Previous pregnancy with slightly elevated blood pressures at 36 to 37 weeks.  She is overall doing well.  Group B strep test was negative.  She declined cervical check again today but we will plan on doing that by 38 weeks.    Good fetal movement.

## 2023-05-12 ENCOUNTER — PRENATAL OFFICE VISIT (OUTPATIENT)
Dept: FAMILY MEDICINE | Facility: CLINIC | Age: 38
End: 2023-05-12
Payer: COMMERCIAL

## 2023-05-12 ENCOUNTER — TELEPHONE (OUTPATIENT)
Dept: FAMILY MEDICINE | Facility: CLINIC | Age: 38
End: 2023-05-12

## 2023-05-12 VITALS
BODY MASS INDEX: 34.69 KG/M2 | DIASTOLIC BLOOD PRESSURE: 84 MMHG | SYSTOLIC BLOOD PRESSURE: 132 MMHG | WEIGHT: 228.13 LBS

## 2023-05-12 DIAGNOSIS — Z3A.37 37 WEEKS GESTATION OF PREGNANCY: Primary | ICD-10-CM

## 2023-05-12 LAB
ALBUMIN UR-MCNC: NEGATIVE MG/DL
APPEARANCE UR: ABNORMAL
BILIRUB UR QL STRIP: NEGATIVE
COLOR UR AUTO: YELLOW
GLUCOSE UR STRIP-MCNC: NEGATIVE MG/DL
HGB UR QL STRIP: NEGATIVE
KETONES UR STRIP-MCNC: NEGATIVE MG/DL
LEUKOCYTE ESTERASE UR QL STRIP: ABNORMAL
NITRATE UR QL: NEGATIVE
PH UR STRIP: 7 [PH] (ref 5–7)
SP GR UR STRIP: 1.01 (ref 1–1.03)
UROBILINOGEN UR STRIP-ACNC: 0.2 E.U./DL

## 2023-05-12 PROCEDURE — 99207 PR PRENATAL VISIT: CPT | Performed by: FAMILY MEDICINE

## 2023-05-12 PROCEDURE — 81003 URINALYSIS AUTO W/O SCOPE: CPT | Performed by: FAMILY MEDICINE

## 2023-05-12 NOTE — TELEPHONE ENCOUNTER
Received call from Patient. States that she is in traffic and will not be here until 4pm.  Spoke with Dr Silverman and states that she needs to talk with Patient.  Relayed information to Patient  Spoke with   Verbalized understanding.  Angel eVrgara RN

## 2023-05-13 NOTE — PROGRESS NOTES
Emeli is a very pleasant 38-year-old G2, P1 who presents to the clinic today at 37 weeks and 1 day gestation for prenatal care and a normal subsequent pregnancy in the third trimester.    Patient has a velamentous cord insertion and she has been in growth ultrasounds which have been normal.    She has no specific questions or concerns.  Previous pregnancy had some slightly elevated blood pressures towards the end of pregnancy.  She was induced for gestational hypertension but did not need treatment.    Initial blood pressure today 138/82 with a repeat of 132/84.  Patient stuck in traffic for 1 hour prior to the appointment and she states that she was very stressed when she came in.  She does not have a monitor at home.    Urinalysis shows no protein.  Minimal lower extremity swelling.  Follow-up in 1 week.  Signs and symptoms of preeclampsia discussed.    Defer cervical check today

## 2023-05-19 ENCOUNTER — PRENATAL OFFICE VISIT (OUTPATIENT)
Dept: FAMILY MEDICINE | Facility: CLINIC | Age: 38
End: 2023-05-19
Payer: COMMERCIAL

## 2023-05-19 VITALS
WEIGHT: 230.13 LBS | DIASTOLIC BLOOD PRESSURE: 72 MMHG | SYSTOLIC BLOOD PRESSURE: 120 MMHG | BODY MASS INDEX: 34.99 KG/M2

## 2023-05-19 DIAGNOSIS — Z3A.38 38 WEEKS GESTATION OF PREGNANCY: Primary | ICD-10-CM

## 2023-05-19 LAB
ALBUMIN UR-MCNC: NEGATIVE MG/DL
APPEARANCE UR: CLEAR
BILIRUB UR QL STRIP: NEGATIVE
COLOR UR AUTO: YELLOW
GLUCOSE UR STRIP-MCNC: NEGATIVE MG/DL
HGB UR QL STRIP: NEGATIVE
KETONES UR STRIP-MCNC: NEGATIVE MG/DL
LEUKOCYTE ESTERASE UR QL STRIP: ABNORMAL
NITRATE UR QL: NEGATIVE
PH UR STRIP: 7 [PH] (ref 5–7)
SP GR UR STRIP: 1.01 (ref 1–1.03)
UROBILINOGEN UR STRIP-ACNC: 0.2 E.U./DL

## 2023-05-19 PROCEDURE — 99207 PR PRENATAL VISIT: CPT | Performed by: FAMILY MEDICINE

## 2023-05-19 PROCEDURE — 81003 URINALYSIS AUTO W/O SCOPE: CPT | Performed by: FAMILY MEDICINE

## 2023-05-19 NOTE — PROGRESS NOTES
Emeli is a very pleasant 38-year-old G2, P1 who presents to clinic today at 38 weeks and 0 days gestation for prenatal care and a normal subsequent pregnancy in the third trimester.    Patient has overall been doing well.  She did some physical therapy on her lower back which was helpful.  She has a velamentous cord insertion but growth ultrasounds have been normal.    No specific questions or concerns today.  Previous pregnancy had some slightly elevated blood pressures for the end of pregnancy but blood pressures have been normal in this pregnancy.    Follow-up next week.  Cervix check shows fingertip dilated and 40% effaced.  -2 station.

## 2023-05-26 ENCOUNTER — PRENATAL OFFICE VISIT (OUTPATIENT)
Dept: FAMILY MEDICINE | Facility: CLINIC | Age: 38
End: 2023-05-26
Payer: COMMERCIAL

## 2023-05-26 VITALS
BODY MASS INDEX: 35.18 KG/M2 | WEIGHT: 231.38 LBS | SYSTOLIC BLOOD PRESSURE: 132 MMHG | DIASTOLIC BLOOD PRESSURE: 70 MMHG

## 2023-05-26 DIAGNOSIS — Z3A.39 39 WEEKS GESTATION OF PREGNANCY: Primary | ICD-10-CM

## 2023-05-26 LAB
ALBUMIN UR-MCNC: NEGATIVE MG/DL
APPEARANCE UR: CLEAR
BILIRUB UR QL STRIP: NEGATIVE
COLOR UR AUTO: YELLOW
GLUCOSE UR STRIP-MCNC: NEGATIVE MG/DL
HGB UR QL STRIP: NEGATIVE
KETONES UR STRIP-MCNC: NEGATIVE MG/DL
LEUKOCYTE ESTERASE UR QL STRIP: ABNORMAL
NITRATE UR QL: NEGATIVE
PH UR STRIP: 7 [PH] (ref 5–7)
SP GR UR STRIP: 1.02 (ref 1–1.03)
UROBILINOGEN UR STRIP-ACNC: 0.2 E.U./DL

## 2023-05-26 PROCEDURE — 81003 URINALYSIS AUTO W/O SCOPE: CPT | Performed by: FAMILY MEDICINE

## 2023-05-26 PROCEDURE — 99207 PR PRENATAL VISIT: CPT | Performed by: FAMILY MEDICINE

## 2023-05-26 NOTE — PROGRESS NOTES
Emeli is a very pleasant 38-year-old G2, P1 presenting to the clinic today at 39 weeks and 0 days gestation for prenatal care and a normal subsequent pregnancy in the third trimester.    Doing well overall.  Velamentous cord insertion noted on 20-week ultrasound we have been doing growth ultrasounds every 4 to 6 weeks.  Normal growth.  We will plan on repeating growth ultrasound next week if she has not delivered at that time.  We will do biophysical profile as well.    Discussed options for induction.  Patient prefers to avoid induction if possible.     Repeat GBS at next visit.  Could consider NST at next visit as well

## 2023-06-02 ENCOUNTER — PRENATAL OFFICE VISIT (OUTPATIENT)
Dept: FAMILY MEDICINE | Facility: CLINIC | Age: 38
End: 2023-06-02
Payer: COMMERCIAL

## 2023-06-02 ENCOUNTER — HOSPITAL ENCOUNTER (OUTPATIENT)
Dept: ULTRASOUND IMAGING | Facility: HOSPITAL | Age: 38
Discharge: HOME | End: 2023-06-02
Attending: FAMILY MEDICINE | Admitting: FAMILY MEDICINE
Payer: COMMERCIAL

## 2023-06-02 VITALS
BODY MASS INDEX: 35.75 KG/M2 | WEIGHT: 235.13 LBS | DIASTOLIC BLOOD PRESSURE: 68 MMHG | SYSTOLIC BLOOD PRESSURE: 132 MMHG

## 2023-06-02 DIAGNOSIS — Z3A.39 39 WEEKS GESTATION OF PREGNANCY: ICD-10-CM

## 2023-06-02 DIAGNOSIS — Z3A.40 40 WEEKS GESTATION OF PREGNANCY: Primary | ICD-10-CM

## 2023-06-02 LAB
ALBUMIN UR-MCNC: NEGATIVE MG/DL
APPEARANCE UR: CLEAR
BILIRUB UR QL STRIP: NEGATIVE
COLOR UR AUTO: YELLOW
GLUCOSE UR STRIP-MCNC: NEGATIVE MG/DL
HGB UR QL STRIP: NEGATIVE
KETONES UR STRIP-MCNC: NEGATIVE MG/DL
LEUKOCYTE ESTERASE UR QL STRIP: NEGATIVE
NITRATE UR QL: NEGATIVE
PH UR STRIP: 6.5 [PH] (ref 5–7)
SP GR UR STRIP: 1.02 (ref 1–1.03)
UROBILINOGEN UR STRIP-ACNC: 0.2 E.U./DL

## 2023-06-02 PROCEDURE — 76816 OB US FOLLOW-UP PER FETUS: CPT

## 2023-06-02 PROCEDURE — 99207 PR PRENATAL VISIT: CPT | Performed by: FAMILY MEDICINE

## 2023-06-02 PROCEDURE — 81003 URINALYSIS AUTO W/O SCOPE: CPT | Performed by: FAMILY MEDICINE

## 2023-06-02 PROCEDURE — 76819 FETAL BIOPHYS PROFIL W/O NST: CPT

## 2023-06-03 ENCOUNTER — HEALTH MAINTENANCE LETTER (OUTPATIENT)
Age: 38
End: 2023-06-03

## 2023-06-03 ENCOUNTER — ANESTHESIA (OUTPATIENT)
Dept: OBGYN | Facility: HOSPITAL | Age: 38
End: 2023-06-03
Payer: COMMERCIAL

## 2023-06-03 ENCOUNTER — ANESTHESIA EVENT (OUTPATIENT)
Dept: OBGYN | Facility: HOSPITAL | Age: 38
End: 2023-06-03
Payer: COMMERCIAL

## 2023-06-03 ENCOUNTER — HOSPITAL ENCOUNTER (INPATIENT)
Facility: HOSPITAL | Age: 38
LOS: 2 days | Discharge: HOME | End: 2023-06-05
Attending: FAMILY MEDICINE | Admitting: FAMILY MEDICINE
Payer: COMMERCIAL

## 2023-06-03 PROBLEM — Z36.89 ENCOUNTER FOR TRIAGE IN PREGNANT PATIENT: Status: ACTIVE | Noted: 2023-06-03

## 2023-06-03 LAB
ABO/RH(D): NORMAL
ANTIBODY SCREEN: NEGATIVE
ERYTHROCYTE [DISTWIDTH] IN BLOOD BY AUTOMATED COUNT: 13.2 % (ref 10–15)
HCT VFR BLD AUTO: 38.9 % (ref 35–47)
HGB BLD-MCNC: 13 G/DL (ref 11.7–15.7)
MCH RBC QN AUTO: 30.4 PG (ref 26.5–33)
MCHC RBC AUTO-ENTMCNC: 33.4 G/DL (ref 31.5–36.5)
MCV RBC AUTO: 91 FL (ref 78–100)
PLATELET # BLD AUTO: 200 10E3/UL (ref 150–450)
RBC # BLD AUTO: 4.27 10E6/UL (ref 3.8–5.2)
RUPTURE OF FETAL MEMBRANES BY ROM PLUS: POSITIVE
SPECIMEN EXPIRATION DATE: NORMAL
WBC # BLD AUTO: 6.8 10E3/UL (ref 4–11)

## 2023-06-03 PROCEDURE — 250N000011 HC RX IP 250 OP 636: Performed by: ANESTHESIOLOGY

## 2023-06-03 PROCEDURE — 250N000009 HC RX 250: Performed by: FAMILY MEDICINE

## 2023-06-03 PROCEDURE — 84112 EVAL AMNIOTIC FLUID PROTEIN: CPT | Performed by: FAMILY MEDICINE

## 2023-06-03 PROCEDURE — 86850 RBC ANTIBODY SCREEN: CPT | Performed by: FAMILY MEDICINE

## 2023-06-03 PROCEDURE — 120N000001 HC R&B MED SURG/OB

## 2023-06-03 PROCEDURE — 250N000013 HC RX MED GY IP 250 OP 250 PS 637: Performed by: FAMILY MEDICINE

## 2023-06-03 PROCEDURE — 99221 1ST HOSP IP/OBS SF/LOW 40: CPT | Performed by: FAMILY MEDICINE

## 2023-06-03 PROCEDURE — 86901 BLOOD TYPING SEROLOGIC RH(D): CPT | Performed by: FAMILY MEDICINE

## 2023-06-03 PROCEDURE — 36415 COLL VENOUS BLD VENIPUNCTURE: CPT | Performed by: FAMILY MEDICINE

## 2023-06-03 PROCEDURE — 86780 TREPONEMA PALLIDUM: CPT | Performed by: FAMILY MEDICINE

## 2023-06-03 PROCEDURE — 258N000003 HC RX IP 258 OP 636: Performed by: FAMILY MEDICINE

## 2023-06-03 PROCEDURE — 370N000003 HC ANESTHESIA WARD SERVICE: Performed by: ANESTHESIOLOGY

## 2023-06-03 PROCEDURE — 85027 COMPLETE CBC AUTOMATED: CPT | Performed by: FAMILY MEDICINE

## 2023-06-03 RX ORDER — LIDOCAINE 40 MG/G
CREAM TOPICAL
Status: DISCONTINUED | OUTPATIENT
Start: 2023-06-03 | End: 2023-06-03 | Stop reason: HOSPADM

## 2023-06-03 RX ORDER — MISOPROSTOL 100 UG/1
25 TABLET ORAL
Status: DISCONTINUED | OUTPATIENT
Start: 2023-06-03 | End: 2023-06-04 | Stop reason: HOSPADM

## 2023-06-03 RX ORDER — METHYLERGONOVINE MALEATE 0.2 MG/ML
200 INJECTION INTRAVENOUS
Status: DISCONTINUED | OUTPATIENT
Start: 2023-06-03 | End: 2023-06-04 | Stop reason: HOSPADM

## 2023-06-03 RX ORDER — METOCLOPRAMIDE HYDROCHLORIDE 5 MG/ML
10 INJECTION INTRAMUSCULAR; INTRAVENOUS EVERY 6 HOURS PRN
Status: DISCONTINUED | OUTPATIENT
Start: 2023-06-03 | End: 2023-06-04 | Stop reason: HOSPADM

## 2023-06-03 RX ORDER — EPHEDRINE SULFATE 50 MG/ML
5 INJECTION, SOLUTION INTRAMUSCULAR; INTRAVENOUS; SUBCUTANEOUS
Status: DISCONTINUED | OUTPATIENT
Start: 2023-06-03 | End: 2023-06-04 | Stop reason: HOSPADM

## 2023-06-03 RX ORDER — OXYTOCIN/0.9 % SODIUM CHLORIDE 30/500 ML
340 PLASTIC BAG, INJECTION (ML) INTRAVENOUS CONTINUOUS PRN
Status: DISCONTINUED | OUTPATIENT
Start: 2023-06-03 | End: 2023-06-04 | Stop reason: HOSPADM

## 2023-06-03 RX ORDER — LIDOCAINE 40 MG/G
CREAM TOPICAL
Status: DISCONTINUED | OUTPATIENT
Start: 2023-06-03 | End: 2023-06-04 | Stop reason: HOSPADM

## 2023-06-03 RX ORDER — ONDANSETRON 4 MG/1
4 TABLET, ORALLY DISINTEGRATING ORAL EVERY 6 HOURS PRN
Status: DISCONTINUED | OUTPATIENT
Start: 2023-06-03 | End: 2023-06-04 | Stop reason: HOSPADM

## 2023-06-03 RX ORDER — OXYTOCIN 10 [USP'U]/ML
10 INJECTION, SOLUTION INTRAMUSCULAR; INTRAVENOUS
Status: DISCONTINUED | OUTPATIENT
Start: 2023-06-03 | End: 2023-06-05 | Stop reason: HOSPADM

## 2023-06-03 RX ORDER — OXYTOCIN/0.9 % SODIUM CHLORIDE 30/500 ML
100-340 PLASTIC BAG, INJECTION (ML) INTRAVENOUS CONTINUOUS PRN
Status: DISCONTINUED | OUTPATIENT
Start: 2023-06-03 | End: 2023-06-05 | Stop reason: HOSPADM

## 2023-06-03 RX ORDER — NALOXONE HYDROCHLORIDE 0.4 MG/ML
0.2 INJECTION, SOLUTION INTRAMUSCULAR; INTRAVENOUS; SUBCUTANEOUS
Status: DISCONTINUED | OUTPATIENT
Start: 2023-06-03 | End: 2023-06-04 | Stop reason: HOSPADM

## 2023-06-03 RX ORDER — FENTANYL/ROPIVACAINE/NS/PF 2MCG/ML-.1
PLASTIC BAG, INJECTION (ML) EPIDURAL
Status: DISCONTINUED | OUTPATIENT
Start: 2023-06-03 | End: 2023-06-04 | Stop reason: HOSPADM

## 2023-06-03 RX ORDER — MORPHINE SULFATE 10 MG/ML
10 INJECTION, SOLUTION INTRAMUSCULAR; INTRAVENOUS
Status: DISCONTINUED | OUTPATIENT
Start: 2023-06-03 | End: 2023-06-04 | Stop reason: HOSPADM

## 2023-06-03 RX ORDER — SODIUM CHLORIDE, SODIUM LACTATE, POTASSIUM CHLORIDE, CALCIUM CHLORIDE 600; 310; 30; 20 MG/100ML; MG/100ML; MG/100ML; MG/100ML
INJECTION, SOLUTION INTRAVENOUS CONTINUOUS PRN
Status: DISCONTINUED | OUTPATIENT
Start: 2023-06-03 | End: 2023-06-04 | Stop reason: HOSPADM

## 2023-06-03 RX ORDER — OXYTOCIN 10 [USP'U]/ML
10 INJECTION, SOLUTION INTRAMUSCULAR; INTRAVENOUS
Status: DISCONTINUED | OUTPATIENT
Start: 2023-06-03 | End: 2023-06-04 | Stop reason: HOSPADM

## 2023-06-03 RX ORDER — HYDROXYZINE HYDROCHLORIDE 50 MG/1
50 TABLET, FILM COATED ORAL
Status: DISCONTINUED | OUTPATIENT
Start: 2023-06-03 | End: 2023-06-04 | Stop reason: HOSPADM

## 2023-06-03 RX ORDER — ONDANSETRON 2 MG/ML
4 INJECTION INTRAMUSCULAR; INTRAVENOUS EVERY 6 HOURS PRN
Status: DISCONTINUED | OUTPATIENT
Start: 2023-06-03 | End: 2023-06-04 | Stop reason: HOSPADM

## 2023-06-03 RX ORDER — NALOXONE HYDROCHLORIDE 0.4 MG/ML
0.4 INJECTION, SOLUTION INTRAMUSCULAR; INTRAVENOUS; SUBCUTANEOUS
Status: DISCONTINUED | OUTPATIENT
Start: 2023-06-03 | End: 2023-06-04 | Stop reason: HOSPADM

## 2023-06-03 RX ORDER — MISOPROSTOL 200 UG/1
400 TABLET ORAL
Status: DISCONTINUED | OUTPATIENT
Start: 2023-06-03 | End: 2023-06-04 | Stop reason: HOSPADM

## 2023-06-03 RX ORDER — BUPIVACAINE HYDROCHLORIDE 2.5 MG/ML
INJECTION, SOLUTION EPIDURAL; INFILTRATION; INTRACAUDAL
Status: COMPLETED | OUTPATIENT
Start: 2023-06-03 | End: 2023-06-03

## 2023-06-03 RX ORDER — CARBOPROST TROMETHAMINE 250 UG/ML
250 INJECTION, SOLUTION INTRAMUSCULAR
Status: DISCONTINUED | OUTPATIENT
Start: 2023-06-03 | End: 2023-06-04 | Stop reason: HOSPADM

## 2023-06-03 RX ORDER — DIPHENHYDRAMINE HYDROCHLORIDE 50 MG/ML
25 INJECTION INTRAMUSCULAR; INTRAVENOUS EVERY 6 HOURS PRN
Status: DISCONTINUED | OUTPATIENT
Start: 2023-06-03 | End: 2023-06-05 | Stop reason: HOSPADM

## 2023-06-03 RX ORDER — DIPHENHYDRAMINE HCL 25 MG
25 CAPSULE ORAL EVERY 6 HOURS PRN
Status: DISCONTINUED | OUTPATIENT
Start: 2023-06-03 | End: 2023-06-05 | Stop reason: HOSPADM

## 2023-06-03 RX ORDER — KETOROLAC TROMETHAMINE 30 MG/ML
30 INJECTION, SOLUTION INTRAMUSCULAR; INTRAVENOUS
Status: DISCONTINUED | OUTPATIENT
Start: 2023-06-03 | End: 2023-06-05 | Stop reason: HOSPADM

## 2023-06-03 RX ORDER — METOCLOPRAMIDE 10 MG/1
10 TABLET ORAL EVERY 6 HOURS PRN
Status: DISCONTINUED | OUTPATIENT
Start: 2023-06-03 | End: 2023-06-04 | Stop reason: HOSPADM

## 2023-06-03 RX ORDER — PROCHLORPERAZINE 25 MG
25 SUPPOSITORY, RECTAL RECTAL EVERY 12 HOURS PRN
Status: DISCONTINUED | OUTPATIENT
Start: 2023-06-03 | End: 2023-06-04 | Stop reason: HOSPADM

## 2023-06-03 RX ORDER — OXYTOCIN/0.9 % SODIUM CHLORIDE 30/500 ML
1-24 PLASTIC BAG, INJECTION (ML) INTRAVENOUS CONTINUOUS
Status: DISCONTINUED | OUTPATIENT
Start: 2023-06-03 | End: 2023-06-04 | Stop reason: HOSPADM

## 2023-06-03 RX ORDER — CITRIC ACID/SODIUM CITRATE 334-500MG
30 SOLUTION, ORAL ORAL
Status: DISCONTINUED | OUTPATIENT
Start: 2023-06-03 | End: 2023-06-04 | Stop reason: HOSPADM

## 2023-06-03 RX ORDER — MISOPROSTOL 200 UG/1
800 TABLET ORAL
Status: DISCONTINUED | OUTPATIENT
Start: 2023-06-03 | End: 2023-06-04 | Stop reason: HOSPADM

## 2023-06-03 RX ORDER — PROCHLORPERAZINE MALEATE 10 MG
10 TABLET ORAL EVERY 6 HOURS PRN
Status: DISCONTINUED | OUTPATIENT
Start: 2023-06-03 | End: 2023-06-04 | Stop reason: HOSPADM

## 2023-06-03 RX ORDER — IBUPROFEN 800 MG/1
800 TABLET, FILM COATED ORAL
Status: DISCONTINUED | OUTPATIENT
Start: 2023-06-03 | End: 2023-06-05 | Stop reason: HOSPADM

## 2023-06-03 RX ADMIN — Medication 25 MCG: at 15:04

## 2023-06-03 RX ADMIN — BUPIVACAINE HYDROCHLORIDE 6 ML: 2.5 INJECTION, SOLUTION EPIDURAL; INFILTRATION; INTRACAUDAL at 22:43

## 2023-06-03 RX ADMIN — Medication 25 MCG: at 17:04

## 2023-06-03 RX ADMIN — Medication 2 MILLI-UNITS/MIN: at 20:15

## 2023-06-03 RX ADMIN — Medication: at 22:59

## 2023-06-03 RX ADMIN — SODIUM CHLORIDE, POTASSIUM CHLORIDE, SODIUM LACTATE AND CALCIUM CHLORIDE: 600; 310; 30; 20 INJECTION, SOLUTION INTRAVENOUS at 20:14

## 2023-06-03 RX ADMIN — SODIUM CHLORIDE, POTASSIUM CHLORIDE, SODIUM LACTATE AND CALCIUM CHLORIDE: 600; 310; 30; 20 INJECTION, SOLUTION INTRAVENOUS at 23:19

## 2023-06-03 RX ADMIN — Medication 25 MCG: at 12:29

## 2023-06-03 ASSESSMENT — ACTIVITIES OF DAILY LIVING (ADL)
ADLS_ACUITY_SCORE: 18
TOILETING_ISSUES: NO
ADLS_ACUITY_SCORE: 18
DIFFICULTY_EATING/SWALLOWING: NO
ADLS_ACUITY_SCORE: 18
CONCENTRATING,_REMEMBERING_OR_MAKING_DECISIONS_DIFFICULTY: NO
ADLS_ACUITY_SCORE: 18
WEAR_GLASSES_OR_BLIND: NO
CHANGE_IN_FUNCTIONAL_STATUS_SINCE_ONSET_OF_CURRENT_ILLNESS/INJURY: NO
DOING_ERRANDS_INDEPENDENTLY_DIFFICULTY: NO
ADLS_ACUITY_SCORE: 18
ADLS_ACUITY_SCORE: 18
FALL_HISTORY_WITHIN_LAST_SIX_MONTHS: NO
DRESSING/BATHING_DIFFICULTY: NO
ADLS_ACUITY_SCORE: 35
WALKING_OR_CLIMBING_STAIRS_DIFFICULTY: NO

## 2023-06-03 NOTE — H&P
[facility] Labor and Delivery History and Physical    Emeli Powell MRN# [id number]   Age: 38 year old YOB: 1985     Date of Admission:  6/3/2023    Primary care provider: Jessica Silverman           Chief Complaint:   Emeli Powell is a 38 year old female who is 40w1d pregnant and being admitted for SROM.    Normal pregnancy -possible rupture of membranes yesterday at noon as she had some leaking yesterday but this morning she had a gush around 8 AM.          Pregnancy history:     OBSTETRIC HISTORY:    OB History    Para Term  AB Living   2 1 1 0 0 1   SAB IAB Ectopic Multiple Live Births   0 0 0 0 1      # Outcome Date GA Lbr Khadar/2nd Weight Sex Delivery Anes PTL Lv   2 Current            1 Term 20 40w3d 16:30 / 05:35 3.657 kg (8 lb 1 oz) F Vag-Spont EPI N SUDHEER      Name: VASYLFEMALE-EMELI      Apgar1: 8  Apgar5: 9      Obstetric Comments   B Positive      Pap Smear 22   HIV Done 22   PHQ-2 Done 23   Flu Shot (N/A)   Tdap Done 4/3/23   COVID Vaccine Done 10/18/21; 21   1hr GTT 2/10/23   Group B Strep Swab 23       EDC: Estimated Date of Delivery: 23    Prenatal Labs:   Lab Results   Component Value Date    AS Negative 2022    HEPBANG Nonreactive 2022    GCPCRT Negative 2020    HGB 11.7 02/10/2023       GBS Status:   No results found for: GBS    Active Problem List  Patient Active Problem List   Diagnosis     Routine postpartum follow-up     NERIS III with severe dysplasia      (normal spontaneous vaginal delivery)     Pregnant     Encounter for triage in pregnant patient       Medication Prior to Admission  Medications Prior to Admission   Medication Sig Dispense Refill Last Dose     Prenatal Vit-Fe Fumarate-FA (PRENATAL COMPLETE PO) Take by mouth daily      .        Maternal Past Medical History:     Past Medical History:   Diagnosis Date     Cervical high risk HPV (human papillomavirus)  test positive     2020                       Family History:   This patient has no significant family history            Social History:   This patient has no significant social history         Review of Systems:   The Review of Systems is negative other than noted in the HPI          Physical Exam:   Vitals were reviewed  Constitutional:   awake, alert, cooperative, no apparent distress, and appears stated age     Lungs:   No increased work of breathing, good air exchange, clear to auscultation bilaterally, no crackles or wheezing     Cardiovascular:   Rrr, no murmur     Neuropsychiatric:   normal     Skin:   no bruising or bleeding and no rashes      Cervix:   Membranes: SROM   Dilation: 2   Effacement: 50%   Station:-2   Consistency: average   Position: Posterior  Presentation:Cephalic  Fetal Heart Rate Tracing: reactive and reassuring  Tocometer: external monitor                       Assessment:   Emeli Powell is a 40w1d pregnant female admitted with spontaneous rupture membranes without active labor.        Plan:   Admit - see IP orders    Given that it is possible that membranes have been ruptured for about 24 hours we will start Cytotec for cervical ripening.      Jessica Silverman MD

## 2023-06-03 NOTE — PROGRESS NOTES
Data: Patient presented to [facility]: 6/3/2023 10:28 AM.  Reason for maternal/fetal assessment is leaking vaginal fluid. Patient reports fluid leakage since around 1200 yesterday. Patient denies vaginal bleeding, abdominal pain, pelvic pressure, nausea, vomiting, headache, visual disturbances, epigastric or RUQ pain, significant edema. Patient reports fetal movement is normal. Patient is a 40w1d . Prenatal record reviewed. Pregnancy has been uncomplicated. Patient reports her placenta has a marginal cord insertion. Support person Ramo is present.     Fetal HR baseline was 140  , variability is moderate  . Accelerations: present  . Decelerations: absent  . Uterine assessment is  Mild on palpation during contractions and soft  at rest. Cervix 2 cm dilated and 50% effaced. Fetal station -2. Fetal presentation per cervical exam and US is vertex. Membranes: ruptured per rom plus.    Vital signs wnl. Patient reports no pain and is coping.     Action: Verbal consent for EFM. Triage assessment completed. Patient does not meet criteria for early labor discharge. Due to rupture of membranes for 24 hours    Response: Dr. Silverman is in department and evaluated patient in person.  Plan will be to admit patient and start oral cytotec. Patient verbalized agreement with plan. Report given to Hans PENA RN and patient moved to [facility].

## 2023-06-03 NOTE — PLAN OF CARE
Goal Outcome Evaluation:       Patient came into hospital rule out rupture- ROM plus positive at 1103.  Patient cervix 1144- 2/50/-2 medium and posterior.  Patient has had 3 doses of cytotec last at 1704.  Patient is having lates at 1712, 1717 and 1728.  Patient was turned and encouraged to drink more fluid.  Patient wanted to wait for IV.   I missed 1st one.  Patient turned on her left side and baby FHR moderate variability and accels. Updated Dr. Allen - plan is to do cervical check at 1830 and call Dr. Allen for a plan.

## 2023-06-03 NOTE — PROGRESS NOTES
Emeli is a very pleasant 38-year-old G2, P1 presenting to the clinic today at 40 weeks and 0 days gestation for prenatal care and a normal subsequent pregnancy in the third trimester    BPP today was normal with EFW 9lbs    Velamentous cord insertion noted on 20 week US - discussed induction at 40 weeks if she would like - declines at this point    Felt some leaking today - exam with sterile glove as above - slight bulgy bag noted.  Some thin discharge noted on glove.  Discussed if she continues to leak she should be seen in L&D    Otherwise - she will reach out to me early next week if not in labor by that point and we will get her set up for her next appt

## 2023-06-04 LAB — T PALLIDUM AB SER QL: NONREACTIVE

## 2023-06-04 PROCEDURE — 120N000001 HC R&B MED SURG/OB

## 2023-06-04 PROCEDURE — 0KQM0ZZ REPAIR PERINEUM MUSCLE, OPEN APPROACH: ICD-10-PCS | Performed by: FAMILY MEDICINE

## 2023-06-04 PROCEDURE — 722N000001 HC LABOR CARE VAGINAL DELIVERY SINGLE

## 2023-06-04 PROCEDURE — 00HU33Z INSERTION OF INFUSION DEVICE INTO SPINAL CANAL, PERCUTANEOUS APPROACH: ICD-10-PCS | Performed by: ANESTHESIOLOGY

## 2023-06-04 PROCEDURE — 59400 OBSTETRICAL CARE: CPT | Performed by: FAMILY MEDICINE

## 2023-06-04 PROCEDURE — 3E0R3BZ INTRODUCTION OF ANESTHETIC AGENT INTO SPINAL CANAL, PERCUTANEOUS APPROACH: ICD-10-PCS | Performed by: ANESTHESIOLOGY

## 2023-06-04 PROCEDURE — 250N000013 HC RX MED GY IP 250 OP 250 PS 637: Performed by: FAMILY MEDICINE

## 2023-06-04 RX ORDER — OXYTOCIN 10 [USP'U]/ML
10 INJECTION, SOLUTION INTRAMUSCULAR; INTRAVENOUS
Status: DISCONTINUED | OUTPATIENT
Start: 2023-06-04 | End: 2023-06-05 | Stop reason: HOSPADM

## 2023-06-04 RX ORDER — BISACODYL 10 MG
10 SUPPOSITORY, RECTAL RECTAL DAILY PRN
Status: DISCONTINUED | OUTPATIENT
Start: 2023-06-04 | End: 2023-06-05 | Stop reason: HOSPADM

## 2023-06-04 RX ORDER — CARBOPROST TROMETHAMINE 250 UG/ML
250 INJECTION, SOLUTION INTRAMUSCULAR
Status: DISCONTINUED | OUTPATIENT
Start: 2023-06-04 | End: 2023-06-05 | Stop reason: HOSPADM

## 2023-06-04 RX ORDER — MODIFIED LANOLIN
OINTMENT (GRAM) TOPICAL
Status: DISCONTINUED | OUTPATIENT
Start: 2023-06-04 | End: 2023-06-05 | Stop reason: HOSPADM

## 2023-06-04 RX ORDER — METHYLERGONOVINE MALEATE 0.2 MG/ML
200 INJECTION INTRAVENOUS
Status: DISCONTINUED | OUTPATIENT
Start: 2023-06-04 | End: 2023-06-05 | Stop reason: HOSPADM

## 2023-06-04 RX ORDER — MISOPROSTOL 200 UG/1
800 TABLET ORAL
Status: DISCONTINUED | OUTPATIENT
Start: 2023-06-04 | End: 2023-06-05 | Stop reason: HOSPADM

## 2023-06-04 RX ORDER — MISOPROSTOL 200 UG/1
400 TABLET ORAL
Status: DISCONTINUED | OUTPATIENT
Start: 2023-06-04 | End: 2023-06-05 | Stop reason: HOSPADM

## 2023-06-04 RX ORDER — DOCUSATE SODIUM 100 MG/1
100 CAPSULE, LIQUID FILLED ORAL DAILY
Status: DISCONTINUED | OUTPATIENT
Start: 2023-06-04 | End: 2023-06-05 | Stop reason: HOSPADM

## 2023-06-04 RX ORDER — OXYTOCIN/0.9 % SODIUM CHLORIDE 30/500 ML
340 PLASTIC BAG, INJECTION (ML) INTRAVENOUS CONTINUOUS PRN
Status: DISCONTINUED | OUTPATIENT
Start: 2023-06-04 | End: 2023-06-05 | Stop reason: HOSPADM

## 2023-06-04 RX ORDER — HYDROCORTISONE 25 MG/G
CREAM TOPICAL 3 TIMES DAILY PRN
Status: DISCONTINUED | OUTPATIENT
Start: 2023-06-04 | End: 2023-06-05 | Stop reason: HOSPADM

## 2023-06-04 RX ORDER — IBUPROFEN 800 MG/1
800 TABLET, FILM COATED ORAL EVERY 6 HOURS PRN
Status: DISCONTINUED | OUTPATIENT
Start: 2023-06-04 | End: 2023-06-05 | Stop reason: HOSPADM

## 2023-06-04 RX ORDER — ACETAMINOPHEN 325 MG/1
650 TABLET ORAL EVERY 4 HOURS PRN
Status: DISCONTINUED | OUTPATIENT
Start: 2023-06-04 | End: 2023-06-05 | Stop reason: HOSPADM

## 2023-06-04 RX ADMIN — DOCUSATE SODIUM 100 MG: 100 CAPSULE, LIQUID FILLED ORAL at 11:29

## 2023-06-04 RX ADMIN — IBUPROFEN 800 MG: 800 TABLET ORAL at 19:50

## 2023-06-04 RX ADMIN — BENZOCAINE AND LEVOMENTHOL: 200; 5 SPRAY TOPICAL at 19:50

## 2023-06-04 RX ADMIN — WITCH HAZEL: 500 SOLUTION RECTAL; TOPICAL at 19:50

## 2023-06-04 ASSESSMENT — ACTIVITIES OF DAILY LIVING (ADL)
ADLS_ACUITY_SCORE: 18
ADLS_ACUITY_SCORE: 22
ADLS_ACUITY_SCORE: 18

## 2023-06-04 NOTE — PROGRESS NOTES
Patient turned call light on because she felt gush of fluid leaking from her vagina. RN changed pad underneath her. Small amount of clear fluid with bloody show.

## 2023-06-04 NOTE — PLAN OF CARE
Problem: Labor  Goal: Acceptable Pain Control  Outcome: Progressing  Emeli is experiencing adequate pain control with her labor epidural and is coping with labor pain. She will continue to cope and have acceptable pain control for the duration of labor.      Problem: Labor  Goal: Normal Uterine Contraction Pattern  Outcome: Progressing   Goal Outcome Evaluation:       Emeli's contractions are every 2-4 minutes apart, lasting for 60-90 seconds each. She will continue to maintain a normal uterine contraction pattern and progress toward delivery.

## 2023-06-04 NOTE — PROGRESS NOTES
Called Dr. Silverman to give patient update: recent SVE (6/90/0), contraction frequency, FHR tracing, repositioning of patient, and labor coping status. Notified of previous check at 0200, during which RN felt bulging bag of water and fetal descent (SVE 4/90/0). MD plans to round on patient at [facility] and arrive at [facility] between 08:30 at 09:00 to assess patient. At that time, MD plans to perform SVE and AROM if patient is agreeable with plan.

## 2023-06-04 NOTE — ANESTHESIA PROCEDURE NOTES
"Epidural catheter Procedure Note    Pre-Procedure   Staff -        Anesthesiologist:  Luba Lugo MD       Performed By: anesthesiologist       Location: OB       Procedure Start/Stop Times: 6/3/2023 10:43 PM and 6/3/2023 11:03 PM       Pre-Anesthestic Checklist: patient identified, IV checked, risks and benefits discussed, informed consent, monitors and equipment checked, pre-op evaluation, at physician/surgeon's request and post-op pain management  Timeout:       Correct Patient: Yes        Correct Procedure: Yes        Correct Site: Yes        Correct Position: Yes   Procedure Documentation  Procedure: epidural catheter       Patient Position: sitting       Patient Prep/Sterile Barriers: x2       Skin prep: Chloraprep       Local skin infiltrated with mL of 1% lidocaine.        Insertion Site: L3-4. (midline approach).       Technique: LORT saline and LORT air        JULIAN at 6 cm.       Needle Type: FiFullyy needle       Needle Gauge: 18.        Needle Length (Inches): 3.5        Catheter: 20 G.          Catheter threaded easily.           Threaded 11 cm at skin.         # of attempts: 2 and  # of redirects:  0    Assessment/Narrative         Paresthesias: No.       Test dose of 3 mL lidocaine 1.5% w/ 1:200,000 epinephrine at 22:58 CDT.         Test dose negative, 3 minutes after injection, for signs of intravascular, subdural, or intrathecal injection.       Insertion/Infusion Method: LORT saline and LORT air       Aspiration negative for Heme or CSF via Epidural Catheter.    Medication(s) Administered   0.25% Bupivacaine PF (Epidural) - EPIDURAL   6 mL - 6/3/2023 10:43:00 PM  Medication Administration Time: 6/3/2023 10:43 PM      FOR [facility] ([facility] [place] ONLY:   Pain Team Contact information: please page the Pain Team Via [employer]. Search \"Pain\". During daytime hours, please page the attending first. At night please page the resident first.      "

## 2023-06-04 NOTE — PROGRESS NOTES
Received a call from Dr. Silverman, asking for patient update. Reviewed and discussed strip with MD: contraction strength and frequency, patient positioning, and occasional late decelerations. MD OK'd pitocin increase per protocol, and suggested repositioning and fluid bolus. Plan to call provider if late decelerations continue.

## 2023-06-04 NOTE — L&D DELIVERY NOTE
OB Vaginal Delivery Note    Emeli Powell MRN# [id number]   Age: 38 year old YOB: 1985       GA: 40w2d  GP:   Labor Complications:    EBL:   mL  Delivery QBL: 200 mL  Delivery Type: Vaginal, Spontaneous   ROM to Delivery Time: (Delivered) Days: 1 Hours: 22 Minutes: 27  [place] Weight: 3.96 kg (8 lb 11.7 oz)    1 Minute 5 Minute 10 Minute   Apgar Totals: 8   9        MARYLOU SORIANOIA     Delivery Details:  Emeli Powell, a 38 year old  female delivered a viable infant with apgars of 8  and 9 . Patient was fully dilated and pushing after 2  hours 39  minutes in active labor.  Pushed for 90 minutes. Delivery was via vaginal, spontaneous  to a sterile field under epidural  anesthesia. Infant delivered in vertex        position. Anterior and posterior shoulders delivered without difficulty. The cord was clamped, cut twice and 3 vessels  were noted. Cord blood was obtained in routine fashion with the following disposition: discard .      Cord complications: nuchal   Placenta delivered at 2023 10:34 AM . Placental disposition was Hospital disposal . Fundal massage performed and fundus found to be firm.     Episiotomy: none    Perineum, vagina, cervix were inspected, and the following lacerations were noted:   Perineal lacerations: 2nd                Any lacerations were repaired in the usual fashion using 3-0 vicryl.    Excellent hemostasis was noted. Needle count correct. Infant and patient in delivery room in good and stable condition.        Dashawn Powell-Emeli [[id number]]    Labor Event Times    Active labor onset date: 23 Onset time:  6:10 AM CDT   Dilation complete date: 23 Complete time:  8:49 AM   Start pushing date/time: 2023 0906      Labor Length    1st Stage (hrs): 2 (min): 39   2nd Stage (hrs): 1 (min): 38   3rd Stage (hrs): 0 (min): 7      Labor Events     labor?: No   steroids: None  Labor Type: Augmentation  Predominate monitoring  "during 1st stage: continuous electronic fetal monitoring     Antibiotics received during labor?: No     Rupture date/time: 23 1200   Rupture type: Spontaneous Rupture of Membranes  Fluid color: Clear  Fluid odor: Normal     Delivery/Placenta Date and Time    Delivery Date: 23 Delivery Time: 10:27 AM   Placenta Date/Time: 2023 10:34 AM  Oxytocin given at the time of delivery: after delivery of baby  Delivering clinician: Jessica Silverman MD   Other personnel present at delivery:  Provider Role   Babatunde Molina RN Delivery Nurse         Vaginal Counts     Initial count performed by 2 team members:  Two Team Members   Andra Molina       Needles Suture Needles Sponges (RETIRED) Instruments   Initial counts   5    Added to count 1 1     Relief counts       Final counts 1 1 5          Placed during labor Accounted for at the end of labor   FSE NA NA   IUPC NA NA   Cervidil NA NA              Final count performed by 2 team members:  Two Team Members   Jesse Silverman      Final count correct?: Yes  Pre-Birth Team Brief: Complete  Post-Birth Team Debrief: Complete     Apgars    Living status: Living   1 Minute 5 Minute 10 Minute 15 Minute 20 Minute   Skin color: 0  1       Heart rate: 2  2       Reflex irritability: 2  2       Muscle tone: 2  2       Respiratory effort: 2  2       Total: 8  9       Apgars assigned by: BABATUNDE RNC     Cord    Vessels: 3 Vessels    Cord Complications: Nuchal   Nuchal Intervention: reduced   Nuchal cord description: tight nuchal cord   Cord Blood Disposition: Discard      Gases Sent?: No      Delayed cord clamping?: Yes      Cord Clamping Delay (seconds):  seconds      Stem cell collection?: No                     [place] Resuscitation    Methods: None      Measurements    Weight: 8 lb 11.7 oz Length: 1' 7.5\"   Head circumference: 36.5 cm       Skin to Skin and Feeding Plan    Skin to skin initiation date/time: 1841    Skin to skin with: Mother  Skin " to skin end date/time: 1/6/1841       Delivery (Maternal) (Provider to Complete) ([id number])    Episiotomy: None  Perineal lacerations: 2nd Repaired?: Yes      Blood Loss  Mother: Emeli Powell #[id number]   Start of Mother's Information    Delivery Blood Loss  06/04/23 0610 - 06/04/23 1229    Delivery QBL (mL) Hospital Encounter 200 mL    Total  200 mL         End of Mother's Information  Mother: Emeli Powell #[id number]          Delivery - Provider to Complete ([id number])    Delivering clinician: Jessica Silverman MD  Delivery Type (Choose the 1 that will go to the Birth History): Vaginal, Spontaneous    Other personnel:  Provider Role   Kassandra Molina, RN Delivery Nurse                               Placenta    Date/Time: 6/4/2023 10:34 AM  Removal: Spontaneous  Disposition: Hospital disposal           Anesthesia    Method: Epidural  Cervical dilation at placement: 4-7                Presentation and Position    Presentation: Vertex                  Jessica Silverman MD

## 2023-06-04 NOTE — ANESTHESIA POSTPROCEDURE EVALUATION
Patient: Emeli Powell    Procedure: * No procedures listed *       Anesthesia Type:  Epidural    Note:  Disposition: Inpatient   Postop Pain Control: Uneventful            Sign Out: Well controlled pain   PONV: No   Neuro/Psych: Uneventful            Sign Out: Acceptable/Baseline neuro status   Airway/Respiratory: Uneventful            Sign Out: Acceptable/Baseline resp. status   CV/Hemodynamics: Uneventful            Sign Out: Acceptable CV status; No obvious hypovolemia; No obvious fluid overload   Other NRE: NONE   DID A NON-ROUTINE EVENT OCCUR? No    Event details/Postop Comments:  Neuraxial block has worn off, no residual numbness or weakness. Pain controlled. Denies headache or back pain. No further questions or concerns. Instructed that we are available 24/7 should she have questions pertaining to her epidural.             Last vitals:  Vitals:    06/04/23 0753 06/04/23 0800 06/04/23 0900   BP: 126/75     Pulse: 87     Resp: 16  18   Temp: 37.2  C (98.9  F)  36.6  C (97.9  F)   SpO2: 97% 96%        Electronically Signed By: Dwason Kelley MD  June 4, 2023  11:21 AM

## 2023-06-04 NOTE — PROVIDER NOTIFICATION
Notified Dr. Silverman at 1650 that the pt's bp is 142/73. The pt is denying symptoms of preeclampsia. No clonus, reflexes diminished. Is it okay to continue Q 4 hour vital checks or do you want more frequent vitals?     Per Dr. Silverman: Okay to continue Q4 VS checks. Call after next assessment if BP is >130s systolic.    Notified Dr. Silverman at 2040 that the pt's bp was 135/67 at the last assessment. Pt still denies symptoms of preeclampsia. No clonus.     Per Dr. Silverman: No additional interventions needed at this time. Continue normal assessments/ vital sign checks. Call if systolic >140 or diastolic >90.

## 2023-06-04 NOTE — ANESTHESIA PREPROCEDURE EVALUATION
Anesthesia Pre-Procedure Evaluation    Patient: Emeli Powell   MRN: [id number] : 1985        Procedure : * No procedures listed *          Past Medical History:   Diagnosis Date     Cervical high risk HPV (human papillomavirus) test positive           No past surgical history on file.   No Known Allergies   Social History     Tobacco Use     Smoking status: Never     Smokeless tobacco: Never   Vaping Use     Vaping status: Never Used   Substance Use Topics     Alcohol use: Not on file      Wt Readings from Last 1 Encounters:   23 106.6 kg (235 lb)        Anesthesia Evaluation   Pt has not had prior anesthetic         ROS/MED HX  ENT/Pulmonary:  - neg pulmonary ROS     Neurologic:  - neg neurologic ROS     Cardiovascular:  - neg cardiovascular ROS     METS/Exercise Tolerance:     Hematologic:  - neg hematologic  ROS     Musculoskeletal:       GI/Hepatic:  - neg GI/hepatic ROS     Renal/Genitourinary:       Endo:     (+) Obesity,     Psychiatric/Substance Use:  - neg psychiatric ROS     Infectious Disease:       Malignancy:       Other:            Physical Exam    Airway        Mallampati: II   TM distance: > 3 FB   Neck ROM: full   Mouth opening: > 3 cm    Respiratory Devices and Support         Dental  no notable dental history         Cardiovascular   cardiovascular exam normal          Pulmonary   pulmonary exam normal                OUTSIDE LABS:  CBC:   Lab Results   Component Value Date    WBC 6.8 2023    WBC 7.5 02/10/2023    HGB 13.0 2023    HGB 11.7 02/10/2023    HCT 38.9 2023    HCT 35.7 02/10/2023     2023     02/10/2023     BMP:   Lab Results   Component Value Date     2022     (L) 2020    POTASSIUM 3.9 2022    POTASSIUM 4.3 2020    CHLORIDE 103 2022    CHLORIDE 106 2020    CO2 29 2022    CO2 17 (L) 2020    BUN 16 2022    BUN 15 2020    CR 0.78 2022    CR 0.78  11/11/2020    GLC 97 04/13/2022    GLC 97 04/13/2022     COAGS:   Lab Results   Component Value Date    PTT 27 11/13/2020    INR 0.91 11/13/2020     POC:   Lab Results   Component Value Date    HCG Positive (A) 10/21/2022    HCGS Negative 09/21/2019     HEPATIC:   Lab Results   Component Value Date    ALBUMIN 3.0 (L) 11/11/2020    PROTTOTAL 5.8 (L) 11/11/2020    ALT 16 11/11/2020    AST 23 11/11/2020    ALKPHOS 156 (H) 11/11/2020    BILITOTAL 0.2 11/11/2020     OTHER:   Lab Results   Component Value Date    LACT 1.2 09/21/2019    JAIME 9.1 04/13/2022    LIPASE 177 09/21/2019    TSH 1.59 04/13/2022    CRP 5.3 09/21/2019       Anesthesia Plan    ASA Status:  2      Anesthesia Type: Epidural.              Consents    Anesthesia Plan(s) and associated risks, benefits, and realistic alternatives discussed. Questions answered and patient/representative(s) expressed understanding.    - Discussed:     - Discussed with:  Patient         Postoperative Care            Comments:    Other Comments: RBA discussed including infection, bleeding, nerve damage, headache, unilateral block, and possible need to replace. Patient expressed understanding and desired to proceed.         neg OB ROS.       Luba Lugo MD

## 2023-06-04 NOTE — PROGRESS NOTES
Emeli continues to experience adequate pain control with her labor epidural and is coping well. Multiple position changes once patient woke up from napping on and off for several hours: left and right sided running man, hands and knees, modified hands and knees/lunge on right and left side

## 2023-06-04 NOTE — PROGRESS NOTES
Writer does SVE and feels a bulging bag of water.  Cervical change made and unable to place IUPC

## 2023-06-04 NOTE — PROGRESS NOTES
0005 Called Dr. Silverman to give patient update, including SVE (2/80/-2), contraction frequency and palpation strength, epidural and pain status, and decels noted. Reviewed interventions used: repositioning patient. Reviewed strip with MD. Received verbal order for [place] catheter placement. Half pitocin if decelerations continue. MD requested recheck SVE at 0200 and insert IUPC if SVE is unchanged.

## 2023-06-05 ENCOUNTER — LACTATION ENCOUNTER (OUTPATIENT)
Age: 38
End: 2023-06-05

## 2023-06-05 VITALS
OXYGEN SATURATION: 96 % | SYSTOLIC BLOOD PRESSURE: 124 MMHG | TEMPERATURE: 97.2 F | DIASTOLIC BLOOD PRESSURE: 61 MMHG | RESPIRATION RATE: 16 BRPM | BODY MASS INDEX: 35.61 KG/M2 | HEIGHT: 68 IN | WEIGHT: 235 LBS | HEART RATE: 77 BPM

## 2023-06-05 PROCEDURE — 250N000013 HC RX MED GY IP 250 OP 250 PS 637: Performed by: FAMILY MEDICINE

## 2023-06-05 PROCEDURE — 99238 HOSP IP/OBS DSCHRG MGMT 30/<: CPT | Performed by: FAMILY MEDICINE

## 2023-06-05 RX ADMIN — ACETAMINOPHEN 650 MG: 325 TABLET ORAL at 00:25

## 2023-06-05 RX ADMIN — DOCUSATE SODIUM 100 MG: 100 CAPSULE, LIQUID FILLED ORAL at 11:29

## 2023-06-05 RX ADMIN — IBUPROFEN 800 MG: 800 TABLET ORAL at 11:30

## 2023-06-05 RX ADMIN — IBUPROFEN 800 MG: 800 TABLET ORAL at 01:54

## 2023-06-05 ASSESSMENT — ACTIVITIES OF DAILY LIVING (ADL)
ADLS_ACUITY_SCORE: 18

## 2023-06-05 NOTE — LACTATION NOTE
"This note was copied from a baby's chart.  This writer met with Emeli to assist with latching infant onto the breast.  She reports infant \"biting\" her nipple on the right breast and states she has not been able to get infant to latch well.      This writer notes Emeli pushed for over 1 hour.  Infant's jaw appears tight.  This writer allowed infant to suck on gloved finger and infant keeps tongue back behind lower gum ridge.  Very gentle massage to jaw joint while infant sucked on gloved finger.  Infant able to make significant improvement and able to now bring tongue out over lower gum ridge.      Education given on hand expression, the importance of optimal positioning for deep, comfortable latch and effective milk transfer, the use of breast compression to assist with milk transfer, listening for swallows, the importance of feeding baby on early hunger cues, and breastfeeding 8-12 times in 24 hours for optimal infant nutrition and hydration as well as for building an optimal milk supply.  She was encouraged to follow up at the Outpatient [facility]ation Clinic after discharge for any breastfeeding questions or concerns.    Emeli able to place infant in football hold and with assistance, latch infant onto the right breast.  She complained of sharp pain.  This writer put gentle downward pressure on infant's chin and sharp pain went away.  Infant seen sucking, but needed frequent stimulation to keep sucking.  Infant was circumcised a couple hours ago and is sleepy.  No swallows heard.      Instructed Emeli to hand express to get milk flowing and soften areolar tissue, allow infant to suck on clean finger to organize his suck, attempt the breast for 5-10 minutes, if no latch, then pump and bottle feed infant.  If latching, encouraged active, rhythmic suck and listen for swallows.  Offer both breasts each feeding and follow up with outpatient lactation clinic prn.  Emeli verbalizes understanding of all " education given.  She denies any further questions.

## 2023-06-05 NOTE — PLAN OF CARE
"  Problem: Plan of Care - These are the overarching goals to be used throughout the patient stay.    Goal: Plan of Care Review  Description: The Plan of Care Review/Shift note should be completed every shift.  The Outcome Evaluation is a brief statement about your assessment that the patient is improving, declining, or no change.  This information will be displayed automatically on your shift note.  Outcome: Met  Goal: Patient-Specific Goal (Individualized)  Description: You can add care plan individualizations to a care plan. Examples of Individualization might be:  \"Parent requests to be called daily at 9am for status\", \"I have a hard time hearing out of my right ear\", or \"Do not touch me to wake me up as it startles me\".  Outcome: Met  Goal: Absence of Hospital-Acquired Illness or Injury  Outcome: Met  Goal: Optimal Comfort and Wellbeing  Outcome: Met  Intervention: Provide Person-Centered Care  Recent Flowsheet Documentation  Taken 6/5/2023 1100 by Manjula Green RN  Trust Relationship/Rapport:   care explained   choices provided   questions answered   questions encouraged  Goal: Readiness for Transition of Care  Outcome: Met  Problem: Postpartum (Vaginal Delivery)  Goal: Successful Maternal Role Transition  Outcome: Met  Goal: Hemostasis  Outcome: Met  Goal: Absence of Infection Signs and Symptoms  Outcome: Met  Goal: Anesthesia/Sedation Recovery  Outcome: Met  Goal: Optimal Pain Control and Function  Outcome: Met  Goal: Effective Urinary Elimination  Outcome: Met     Goal Outcome Evaluation:    Patient meets postpartum recovery goals and discharge criteria. Patient has been seen by provider and discharge orders received. Reviewed discharged instructions with patient and provider. Patient discharged ambulatory with baby and spouse.   "

## 2023-06-05 NOTE — PLAN OF CARE
Problem: Postpartum (Vaginal Delivery)  Goal: Hemostasis  Outcome: Progressing   Goal Outcome Evaluation:                    Problem: Postpartum (Vaginal Delivery)  Goal: Successful Maternal Role Transition  Outcome: Progressing       Patient's blood pressure has been stable throughout the night. She denies headache, blurred vision or epigastric pain. Will assess per orders.

## 2023-06-05 NOTE — DISCHARGE SUMMARY
[facility]    Discharge Summary  Obstetrics    Date of Admission:  6/3/2023  Date of Discharge:  2023  Discharging Provider: Jessica Silverman MD    Discharge Diagnoses       History of Present Illness   Emeli Powell is a 38 year old female who presented with SROM.    Hospital Course   3 doses of cytotec to bring Triplett score to 6.  Pitocin initiated with progress but some variable and late decels.  Pitocin discontinued and progressed to complete on her own.  Epidural anesthesia.  The patient's hospital course was unremarkable.  She recovered as anticipated and experienced no post-delivery complications. On discharge, her pain was well controlled. Vaginal bleeding is similar to peak menstrual flow.  Voiding without difficulty.  Ambulating well and tolerating a normal diet.  No fevers.  Breastfeeding well.  Infant is stable.  She was discharged on post-partum day #2.    Post-partum hemoglobin:   Hemoglobin   Date Value Ref Range Status   2023 13.0 11.7 - 15.7 g/dL Final   2019 13.8 11.7 - 15.7 g/dL Final       [place] Depression Scale  Thoughts of Harming Self:    Total Score:        Jessica Silverman MD    Discharge Disposition   Discharged to home   Condition at discharge: Stable    Primary Care Physician   Jessica Silverman    Consultations This Hospital Stay   LACTATION IP CONSULT    Discharge Orders      Breast pump - Manual/Electric    Breast Pump Documentation:  Manual/Electric Pump: To support adequate breast milk production and nutrition for infant.     I, the undersigned, certify that the above prescribed supplies are medically necessary for this patient and is both reasonable and necessary in reference to accepted standards of medical and necessary in reference to accepted standards of medical practice in the treatment of this patient's condition and is not prescribed as a convenience.     Discharge Medications   Current Discharge  Medication List      CONTINUE these medications which have NOT CHANGED    Details   Prenatal Vit-Fe Fumarate-FA (PRENATAL COMPLETE PO) Take by mouth daily           Allergies   No Known Allergies

## 2023-06-05 NOTE — DISCHARGE INSTRUCTIONS
Warning Signs after Having a Baby    Keep this paper on your fridge or somewhere else where you can see it.    Call your provider if you have any of these symptoms up to 12 weeks after having your baby.    Thoughts of hurting yourself or your baby  Pain in your chest or trouble breathing  Severe headache not helped by pain medicine  Eyesight concerns (blurry vision, seeing spots or flashes of light, other changes to eyesight)  Fainting, shaking or other signs of a seizure    Call 9-1-1 if you feel that it is an emergency.     The symptoms below can happen to anyone after giving birth. They can be very serious. Call your provider if you have any of these warning signs.    My provider s phone number: _______________________    Losing too much blood (hemorrhage)    Call your provider if you soak through a pad in less than an hour or pass blood clots bigger than a golf ball. These may be signs that you are bleeding too much.    Blood clots in the legs or lungs    After you give birth, your body naturally clots its blood to help prevent blood loss. Sometimes this increased clotting can happen in other areas of the body, like the legs or lungs. This can block your blood flow and be very dangerous.     Call your provider if you:  Have a red, swollen spot on the back of your leg that is warm or painful when you touch it.   Are coughing up blood.     Infection    Call your provider if you have any of these symptoms:  Fever of 100.4 F (38 C) or higher.  Pain or redness around your stitches if you had an incision.   Any yellow, white, or green fluid coming from places where you had stitches or surgery.    Mood Problems (postpartum depression)    Many people feel sad or have mood changes after having a baby. But for some people, these mood swings are worse.     Call your provider right away if you feel so anxious or nervous that you can't care for yourself or your baby.    Preeclampsia (high blood pressure)    Even if you  didn't have high blood pressure when you were pregnant, you are at risk for the high blood pressure disease called preeclampsia. This risk can last up to 12 weeks after giving birth.     Call your provider if you have:   Pain on your right side under your rib cage  Sudden swelling in the hands and face    Remember: You know your body. If something doesn't feel right, get medical help.     For informational purposes only. Not to replace the advice of your health care provider. Copyright 2020 [facility]. All rights reserved. Clinically reviewed by Svitlana Zamudio, RNC-OB, MSN. [employer] [id number] - Rev 02/23.

## 2023-06-05 NOTE — PLAN OF CARE
Problem: Plan of Care   Goal: Plan of Care Review  Outcome: Progressing    VSS, ex one elevated bp of 142/73. Denies preeclampsia symptoms, no clonus. Fundus firm. Pt reported tolerable pain relief with prn ibuprofen use, pt denied additional pain medications when offered. Ambulating independently in the room. Breastfeeding with good latch observed. Bonding well with baby.

## 2023-06-07 ENCOUNTER — NURSE TRIAGE (OUTPATIENT)
Dept: NURSING | Facility: CLINIC | Age: 38
End: 2023-06-07
Payer: COMMERCIAL

## 2023-06-07 ENCOUNTER — HOSPITAL ENCOUNTER (OUTPATIENT)
Facility: HOSPITAL | Age: 38
Discharge: HOME | End: 2023-06-07
Attending: FAMILY MEDICINE | Admitting: FAMILY MEDICINE
Payer: COMMERCIAL

## 2023-06-07 ENCOUNTER — TELEPHONE (OUTPATIENT)
Dept: NURSING | Facility: CLINIC | Age: 38
End: 2023-06-07
Payer: COMMERCIAL

## 2023-06-07 VITALS — DIASTOLIC BLOOD PRESSURE: 71 MMHG | HEART RATE: 86 BPM | SYSTOLIC BLOOD PRESSURE: 123 MMHG

## 2023-06-07 LAB
ALBUMIN MFR UR ELPH: 8 MG/DL (ref 1–14)
ALBUMIN SERPL BCG-MCNC: 3 G/DL (ref 3.5–5.2)
ALP SERPL-CCNC: 110 U/L (ref 35–104)
ALT SERPL W P-5'-P-CCNC: 77 U/L (ref 10–35)
ANION GAP SERPL CALCULATED.3IONS-SCNC: 10 MMOL/L (ref 7–15)
AST SERPL W P-5'-P-CCNC: 59 U/L (ref 10–35)
BILIRUB SERPL-MCNC: <0.2 MG/DL
BUN SERPL-MCNC: 13.5 MG/DL (ref 6–20)
CALCIUM SERPL-MCNC: 8.8 MG/DL (ref 8.6–10)
CHLORIDE SERPL-SCNC: 106 MMOL/L (ref 98–107)
CREAT SERPL-MCNC: 0.81 MG/DL (ref 0.51–0.95)
CREAT UR-MCNC: 40.9 MG/DL
DEPRECATED HCO3 PLAS-SCNC: 22 MMOL/L (ref 22–29)
ERYTHROCYTE [DISTWIDTH] IN BLOOD BY AUTOMATED COUNT: 13.3 % (ref 10–15)
GFR SERPL CREATININE-BSD FRML MDRD: >90 ML/MIN/1.73M2
GLUCOSE SERPL-MCNC: 109 MG/DL (ref 70–99)
HCT VFR BLD AUTO: 35 % (ref 35–47)
HGB BLD-MCNC: 11.7 G/DL (ref 11.7–15.7)
INR PPP: 0.95 (ref 0.85–1.15)
MCH RBC QN AUTO: 30.6 PG (ref 26.5–33)
MCHC RBC AUTO-ENTMCNC: 33.4 G/DL (ref 31.5–36.5)
MCV RBC AUTO: 92 FL (ref 78–100)
PLATELET # BLD AUTO: 196 10E3/UL (ref 150–450)
POTASSIUM SERPL-SCNC: 4.2 MMOL/L (ref 3.4–5.3)
PROT SERPL-MCNC: 5.7 G/DL (ref 6.4–8.3)
PROT/CREAT 24H UR: 0.2 MG/MG CR (ref 0–0.2)
RBC # BLD AUTO: 3.82 10E6/UL (ref 3.8–5.2)
SODIUM SERPL-SCNC: 138 MMOL/L (ref 136–145)
WBC # BLD AUTO: 5.3 10E3/UL (ref 4–11)

## 2023-06-07 PROCEDURE — G0463 HOSPITAL OUTPT CLINIC VISIT: HCPCS

## 2023-06-07 PROCEDURE — 85027 COMPLETE CBC AUTOMATED: CPT | Performed by: FAMILY MEDICINE

## 2023-06-07 PROCEDURE — 36415 COLL VENOUS BLD VENIPUNCTURE: CPT | Performed by: FAMILY MEDICINE

## 2023-06-07 PROCEDURE — 84156 ASSAY OF PROTEIN URINE: CPT | Performed by: FAMILY MEDICINE

## 2023-06-07 PROCEDURE — 80053 COMPREHEN METABOLIC PANEL: CPT | Performed by: FAMILY MEDICINE

## 2023-06-07 PROCEDURE — 85610 PROTHROMBIN TIME: CPT | Performed by: FAMILY MEDICINE

## 2023-06-07 ASSESSMENT — ACTIVITIES OF DAILY LIVING (ADL): ADLS_ACUITY_SCORE: 31

## 2023-06-08 NOTE — TELEPHONE ENCOUNTER
Nurse Triage SBAR    Is this a 2nd Level Triage? YES, LICENSED PRACTITIONER REVIEW IS REQUIRED    Situation: Pt called with concerns for high blood pressure, abdominal pain, and swollen feet.    Background: Pt is 3 days postpartum.    Assessment: Pt states she was borderline hypertensive in the hospital. Tonight her BP is 164/136 accompanied by swollen feet and RUQ abdominal pain for the last 4 hours. She denies visual changes.     Protocol Recommended Disposition:   Go to ED Now (Or PCP Triage)    Recommendation: On call provider spoken to who is pt's PCP. They stated they would call pt themselves.    Reason for Disposition    Upper abdominal pain lasts > 1 hour    Additional Information    Negative: SEVERE difficulty breathing (e.g., struggling for each breath, speaks in single words)    Negative: Sounds like a life-threatening emergency to the triager    Negative: Difficult to awaken or acting confused (e.g., disoriented, slurred speech)    Negative: Severe difficulty breathing (e.g., struggling for each breath, speaks in single words)    Negative: [1] Weakness of the face, arm or leg on one side of the body AND [2] new onset    Negative: [1] Numbness (i.e., loss of sensation) of the face, arm or leg on one side of the body AND [2] new onset    Negative: [1] Chest pain lasts > 5 minutes AND [2] history of heart disease  (i.e., heart attack, bypass surgery, angina, angioplasty, CHF)    Negative: [1] Chest pain AND [2] took nitrogylcerin AND [3] pain was not relieved    Negative: Sounds like a life-threatening emergency to the triager    Negative: Chest pain    Negative: [1] Difficulty breathing AND [2] new-onset or worsening    Negative: SEVERE leg swelling (e.g., swelling extends above knee, entire leg is swollen)    Negative: [1] Red area or streak AND [2] fever    Negative: [1] Swelling is painful to touch AND [2] fever    Negative: [1] Cast on leg or ankle AND [2] now increased pain    Negative: New blurred  vision or vision changes    Negative: SEVERE headache    Negative: Patient sounds very sick or weak to the triager    Protocols used: POSTPARTUM - LEG SWELLING AND EDEMA-A-, HIGH BLOOD PRESSURE-A-    Luba Goode RN  [facility] Nurse Advisor   6/7/2023  8:56 PM

## 2023-06-08 NOTE — PROGRESS NOTES
Assumed care of pt after receiving report from SAMANTHA Ham  BP's have been 120's-130's/70's.  Lab results back. AST and ALT slightly elevated. PCR 0.2.  Pt states HA is gone. Last took Ibuprofen for after delivery pain at 1800. Inquired about strength of HA's and pt stated that they are not bad and rate them at 3/10. Will update Dr. Silverman at this time.Doris Cerda RN

## 2023-06-08 NOTE — PROGRESS NOTES
Dr. Silverman notified at 2330 of labs as well as pt stating headache is gone and pt has not taken Ibuprofen since 1800. MD reviewed labs and gave orders to discharge pt to home, and if pt wants, she can make a nurse only appt in clinic to get blood pressure checked in the next few days as well as MyChart message MD with questions or concerns. MS will not be in the office the next few days, but will check for messages.   Pt and  notified of conversation with Dr. Silverman and orders to discharge. Pt comfortable with plan. .Doris Cerda RN

## 2023-06-08 NOTE — PROGRESS NOTES
Written and verbal discharge instructions given re: making BP appt and messaging MD per Dr. Silverman conversation. Pt verbalized understanding and agreement, them amb from unit in stable condition. Accompanied by  and infant.Doris Cerda RN

## 2023-06-08 NOTE — DISCHARGE INSTRUCTIONS
If desired, make a nurse only appointment in clinic to get blood pressure checked in the next couple of days. Message Dr. Silverman through [employer] with questions.

## 2023-06-13 ENCOUNTER — ALLIED HEALTH/NURSE VISIT (OUTPATIENT)
Dept: MIDWIFE SERVICES | Facility: CLINIC | Age: 38
End: 2023-06-13
Payer: COMMERCIAL

## 2023-06-13 VITALS — HEART RATE: 88 BPM | SYSTOLIC BLOOD PRESSURE: 120 MMHG | DIASTOLIC BLOOD PRESSURE: 66 MMHG

## 2023-06-13 DIAGNOSIS — O92.29 POSTPARTUM NIPPLE PAIN: Primary | ICD-10-CM

## 2023-06-13 PROCEDURE — 99205 OFFICE O/P NEW HI 60 MIN: CPT | Performed by: ADVANCED PRACTICE MIDWIFE

## 2023-06-13 NOTE — PATIENT INSTRUCTIONS
"  1.  Use good positioning for deep latch, with baby held close to body and baby's head/shoulders/hips in good alignment.  When in a seated position, use a pillow to help bring baby close to breasts, and stepstool to elevate your knees above hips.   2.  Also consider the laid-back breastfeeding position, as this may help Adonis open his mouth more widely and be more comfortable for you.  3.   Present breast in the \"sandwich\" hold, compressing breast vertically and in line with baby's mouth, for baby to get a larger mouthful of breast and a deeper latch.  If there is pinching or pain, try using a finger to give a little gentle pressure on his chin to help him open more widely and take in more of your breast.  If it is still painful, use a finger to break the suction, remove baby from the breast and try again until there is no pain with nursing.  There is sometimes a little pain when the baby first begins sucking, but after the first few seconds there should be no pain--only a tugging feeling.  4.  Babies latch best to the breast by bringing their chin in first, so point your nipple towards baby's nose, tuck the chin in close, and then wait for him mouth to open.  When his mouth opens, bring his head in deeply.  Baby's chin should be snugged deeply in your breast, their upper cheeks should be touching the breast, and their nose just out of the breast.  5.  Adonis needs about 22 - 23 oz of milk each day to grow well, or 2.5 - 3 oz/feeding.  If he nurses at home as he did in the office today, about 8 times/day, he needs about 8 oz per day in supplementation, using your breastmilk as your first choice and formula when the supply of pumped milk runs out.  You can give this after feedings, or distributed throughout the day according to his feeding cues.  6.  Continue pumping as you are able to have this extra milk to offer to Adonis and promote strong milk supply.  Sometimes pumping while you have some warmth on your breasts " (like a heating pad or microwaved hot pack) is helpful, and gentle massage can also help release more milk.   It is more effective to pump more frequently for shorter time periods than it is to pump less often for longer:  For example, it is better to pump 6 times/day for 15 minutes each than it is to pump 3 times/day for 30 minutes.    7.  Adonis should continue to gain weight, at about 1 oz/day.  Consider a weight check at the end of the week to make sure Adonis is continuing to gain well.  Follow up with lactation as needed, and pediatric provider as planned.  Primordial can be used for brief questions, but it's important to know that messages are not seen Friday through Sunday. If urgent help is needed, Monday through Friday you can call [phone] and one of our lactation consultants will get the message and respond; if you need a rapid response over a weekend or holiday, it is best to call your on-call maternity or pediatric provider.  Please feel free to schedule a return visit if the concern is more detailed;  telephone visits are also an option if you don't feel you need to be seen in person.    __________________    This is a great video that shows a baby nursing well at the breast, and how to tell when baby is actively swallowing:      Is Your Baby Getting Enough Milk?   https://globalhealthmedia.org/videos/is-your-baby-getting-enough-milk/     __________________________________________    For good videos on the laid-back breastfeeding position:  Www.naturalbreastfeeding.com  Www.[employer].com     _______________________________________________________    Good breastfeeding resources:    Websites:  www.MedAdherence  www.[employer]/blog/  www.llli.org/breastfeeding-info/    Instagram:    [email or web address]  [email or web address]    Books:   The Womanly Art of Breastfeeding by La [employer]ec[employer] League  Breastfeeding Doesn't Need to Suck, by Letty Salas      For help with using baby  carriers:  https://babywearingtwincities.org/

## 2023-06-13 NOTE — PROGRESS NOTES
"Assessment:   1.  Nine day old infant with indeterminate weight gain:  7 % below birthweight on this scale, but weight at ped office at 2 days of age was only 2% below birthweight   2.  Tends to shallow latch, although pain improved today with position adjustments  3.  Fair suck--sleepy at breast and required significant stimulation.  Milk transfer slightly below needs--requires some continued supplementation   4.  Mother may have slightly low milk supply, due to poor latch during first days    Plan:   1.  Use good positioning for deep latch, with baby held close to body and baby's head/shoulders/hips in good alignment.  When in a seated position, use a pillow to help bring baby close to breasts, and stepstool to elevate your knees above hips.   2.  Also consider the laid-back breastfeeding position, as this may help Adonis open his mouth more widely and be more comfortable for you.  3.   Present breast in the \"sandwich\" hold, compressing breast vertically and in line with baby's mouth, for baby to get a larger mouthful of breast and a deeper latch.  If there is pinching or pain, try using a finger to give a little gentle pressure on his chin to help him open more widely and take in more of your breast.  If it is still painful, use a finger to break the suction, remove baby from the breast and try again until there is no pain with nursing.  There is sometimes a little pain when the baby first begins sucking, but after the first few seconds there should be no pain--only a tugging feeling.  4.  Babies latch best to the breast by bringing their chin in first, so point your nipple towards baby's nose, tuck the chin in close, and then wait for him mouth to open.  When his mouth opens, bring his head in deeply.  Baby's chin should be snugged deeply in your breast, their upper cheeks should be touching the breast, and their nose just out of the breast.  5.  Adonis needs about 22 - 23 oz of milk each day to grow well, or 2.5 " "- 3 oz/feeding.  If he nurses at home as he did in the office today, about 8 times/day, he needs about 8 oz per day in supplementation, using your breastmilk as your first choice and formula when the supply of pumped milk runs out.  You can give this after feedings, or distributed throughout the day according to his feeding cues.  6.  Continue pumping as you are able to have this extra milk to offer to Adonis and promote strong milk supply.  Sometimes pumping while you have some warmth on your breasts (like a heating pad or microwaved hot pack) is helpful, and gentle massage can also help release more milk.   It is more effective to pump more frequently for shorter time periods than it is to pump less often for longer:  For example, it is better to pump 6 times/day for 15 minutes each than it is to pump 3 times/day for 30 minutes.    7.  Adonis should continue to gain weight, at about 1 oz/day.  Consider a weight check at the end of the week to make sure Adonis is continuing to gain well.  Follow up with lactation as needed, and pediatric provider as planned.  Tracky can be used for brief questions, but it's important to know that messages are not seen Friday through Sunday. If urgent help is needed, Monday through Friday you can call [phone] and one of our lactation consultants will get the message and respond; if you need a rapid response over a weekend or holiday, it is best to call your on-call maternity or pediatric provider.  Please feel free to schedule a return visit if the concern is more detailed;  telephone visits are also an option if you don't feel you need to be seen in person.    Subjective: Emeli is here today because of painful feeding that has been present since baby's birth.  Feels baby is \"biting\" at breast, and her nipples have been very tender and bleeding.  Has been using Silverettes for nipple healing, and alternating a day of breastfeeding with a day of pumping and bottlefeeding to " "manage this.  She also notices that Adonis is very sleepy at both bottle and breast, and it takes a very long time to get him to take a feeding by bottle.  Using Macarena \"Natural Flow\" nipple. Has seen chiropractor to help manage baby's tight jaw, which she felt did help with bottlefeeding, but has not attempted breastfeeding since then due to nipple damage.    Emeli is vaccinated for Covid-19.    Hospital Course: 24-hour induction for PROM, with just around 2 1/2 hours of active labor.  Uncomplicated birth.  Seen by hospital IBCLC for baby \"biting\" at breast--noted tight jaw, improved with massage. Latch pain improved with chin pressure.  Emeli briefly readmitted on third pp day for elevated BP.    Mother's Relevant Med/Surg History: Severe cervical dysplasia    Breast Surgery:  none    Breastfeeding Goals:    Previous Breastfeeding Experience:  First baby was slow to regain birthweight, and Emeli \"triple fed\" for many months.   4-5 months.      Infant's name: Adonis  Infant's bday: [birth year]  Gestational age: 40w2d  Infant's birth weight: 8 # 11.7 oz  Discharge weight: 8 # 5.1 oz  Recent weights:  6/6/23:  8 # 9 oz    Mode of delivery: vaginal  Pediatric Provider: Dr. Silverman, [facility]    Frequency and duration of feedings: every 3 hours  Swallows audible per mother: unsure  Numbers of feedings in 24 hours: about 8   Number urines per day: 8 +  Number of stools per day and their color: 3,  yellow    Supplementation: with 1 1/2 - 2 oz expressed milk each feeding  Pumping: about every 3 hours, yielding around 2 oz from both breasts together.  Had Delpor but this had problem with charging (contacting company), currently using Medela pump    Objective/Physical exam:   Mother: Noticed breasts grew larger and areolas darkened during pregnancy and she noticed primary engorgement when her milk came in.  Her nipples are everte, the areola is compressible, the breast is soft and full.  Nipples appear intact " today.    Sore nipples: tender but improving  EPDS: not completed today    Assessment of infant: 48.77% Weight for age percentile   Age today: 9 days  Today's weight: 8 # 1.2 oz  Amount of milk transferred from LEFT side: 0.8 oz   Amount of milk transferred from RIGHT side: 0.8 oz    Baby has full flexion of arms and legs, normal tone, behavior is alert and active, respirations are normal, skin is normal, hydration is normal, jaw is normal size and alignment, palate is normal, frenulum is normal, baby can lateralize tongue, has adequate tongue lift, and tongue can protrude past bottom gum line. Upper labial frenulum is normal.    Suck exam:  Baby has strong, coordinated suck with good tongue cupping    Baby thrush: none   Jaundice: none    Feeding assessment: Baby can hold suction with tongue while at the breast, but does have tendency to shallow latch.  Applied gentle chin pressure to assist with deeper latch, which was temporarily helpful.  Also requires significant stimulation to continue sucking.    Alignment: The baby was flex relaxed. Baby's head was aligned with its trunk. Baby did face mother. Baby was in cross cradle and laid back positions today.   Areolar Grasp: Baby was able to open mouth somewhat widely, improved with laid-back position and gentle chin pressure. Baby's lips were not pursed. Baby's lips did flange outward. Tongue was visible over bottom gum. Baby had complete seal.     Areolar Compression: Baby made rhythmic motion. There were no clicking or smacking sounds. There was no severe nipple discomfort. Left nipple appeared rounded after feeding;  Right nipple was slightly creased but without discomfort.    Audible swallowing: Baby made quiet sounds of swallowing: There was a slight increase in frequency after milk ejection reflex. The milk ejection reflex is normal and milk supply may be slightly low.     /66 (BP Location: Right arm, Patient Position: Sitting, Cuff Size: Adult Large)    Pulse 88   LMP 2022   Breastfeeding Yes   OB History    Para Term  AB Living   2 2 2 0 0 2   SAB IAB Ectopic Multiple Live Births   0 0 0 0 2      # Outcome Date GA Lbr Khadar/2nd Weight Sex Delivery Anes PTL Lv   2 Term 23 40w2d 02:39 / 01:38 3.96 kg (8 lb 11.7 oz) M Vag-Spont EPI N SUDHEER      Name: VASYLMALE-BLAS      Apgar1: 8  Apgar5: 9   1 Term 20 40w3d 16:30 / 05:35 3.657 kg (8 lb 1 oz) F Vag-Spont EPI N SUDHEER      Name: VASYLFEMALE-BLAS      Apgar1: 8  Apgar5: 9      Obstetric Comments   B Positive      Pap Smear 22   HIV Done 22   PHQ-2 Done 23   Flu Shot (N/A)   Tdap Done 4/3/23   COVID Vaccine Done 10/18/21; 21   1hr GTT 2/10/23   Group B Strep Swab 23       Current Outpatient Medications:      Prenatal Vit-Fe Fumarate-FA (PRENATAL COMPLETE PO), Take by mouth daily, Disp: , Rfl:   Past Medical History:   Diagnosis Date     Cervical high risk HPV (human papillomavirus) test positive          No past surgical history on file.  Family History   Problem Relation Age of Onset     No Known Problems Mother      No Known Problems Father      Colon Cancer Sister      Diabetes Mother      Heart Disease Paternal Grandmother        Time spent:  Chart review/prechartin min prior to day of service  Face-to-face visit:   60 min   Documentation:  17 min  Total time spent on day of service: 77 min    Erin Kathleen, PRETTY, CNM, IBCLC

## 2023-07-10 ENCOUNTER — PRENATAL OFFICE VISIT (OUTPATIENT)
Dept: FAMILY MEDICINE | Facility: CLINIC | Age: 38
End: 2023-07-10
Payer: COMMERCIAL

## 2023-07-10 VITALS
SYSTOLIC BLOOD PRESSURE: 110 MMHG | HEART RATE: 71 BPM | OXYGEN SATURATION: 98 % | BODY MASS INDEX: 31.73 KG/M2 | TEMPERATURE: 97.7 F | RESPIRATION RATE: 12 BRPM | HEIGHT: 68 IN | WEIGHT: 209.38 LBS | DIASTOLIC BLOOD PRESSURE: 64 MMHG

## 2023-07-10 DIAGNOSIS — Z12.4 CERVICAL CANCER SCREENING: ICD-10-CM

## 2023-07-10 DIAGNOSIS — N89.8 VAGINAL DISCHARGE: ICD-10-CM

## 2023-07-10 LAB
CLUE CELLS: PRESENT
TRICHOMONAS, WET PREP: ABNORMAL
WBC'S/HIGH POWER FIELD, WET PREP: ABNORMAL
YEAST, WET PREP: ABNORMAL

## 2023-07-10 PROCEDURE — 99212 OFFICE O/P EST SF 10 MIN: CPT | Mod: 24 | Performed by: FAMILY MEDICINE

## 2023-07-10 PROCEDURE — G0145 SCR C/V CYTO,THINLAYER,RESCR: HCPCS | Performed by: FAMILY MEDICINE

## 2023-07-10 PROCEDURE — 87624 HPV HI-RISK TYP POOLED RSLT: CPT | Performed by: FAMILY MEDICINE

## 2023-07-10 PROCEDURE — 87210 SMEAR WET MOUNT SALINE/INK: CPT | Performed by: FAMILY MEDICINE

## 2023-07-10 PROCEDURE — 99207 PR POST PARTUM EXAM: CPT | Performed by: FAMILY MEDICINE

## 2023-07-10 NOTE — PROGRESS NOTES
"Assessment:     Emeli Powell is a 38 year old female here for postpartum exam at 5 weeks postpartum.    (Z39.2) Routine postpartum follow-up  (primary encounter diagnosis)      (Z12.4) Cervical cancer screening  Comment: History of positive \"other\" high risk HPV in 2021.  Pap last year was normal.  Plan: Pap screen with HPV - recommended age 30 - 65         years          (N89.8) Vaginal discharge  Comment: Minimal and not overly bothersome patient to patient but will get wet prep today.  Plan: Wet prep - Clinic Collect        . .    Plan:     1.  Doing well.  Pap smear will be back next week.  2. Contraception: Planning on Mirena IUD next week.  Helped her get this scheduled.   3. No follow-ups on file.      Subjective:      Emeli Powell is a 38 year old female who presents for a postpartum visit. She is 5 weeks postpartum following a vaginal delivery.  I have fully reviewed the prenatal and intrapartum course. The delivery was at 40 gestational weeks. Outcome: spontaneous vaginal delivery. Postpartum course has been uncomplicated. Baby's course has been uncomplicated. Baby is feeding by both breast and bottle(pumped breast milk)}:  Bled for  2 weeks postpartum.  She is currently experiencing  No bleeding. Bowel function is normal -she notes that she has some slightly painful hemorrhoids. Bladder function is normal. Patient has not resumed intercourse. Contraception method is abstinence. Postpartum depression screening score: 4     The following portions of the patient's history were reviewed and updated as appropriate: allergies, current medications and problem list    Review of Systems  General:  Denies problem  Eyes: Denies problem  Ears/Nose/Throat: Denies problem  Cardiovascular: Denies problem  Respiratory:  Denies problem  Gastrointestinal:  Denies problem, Genitourinary: Denies problem  Musculoskeletal:  Denies problem  Skin: Denies problem  Neurologic: Denies problem  Psychiatric: Denies " "problem  Endocrine: Denies problem  Heme/Lymphatic: Denies problem   Allergic/Immunologic: Denies problem      Objective:      /64   Pulse 71   Temp 97.7  F (36.5  C) (Tympanic)   Resp 12   Ht 1.727 m (5' 8\")   Wt 95 kg (209 lb 6 oz)   LMP 08/26/2022   SpO2 98%   Breastfeeding Yes   BMI 31.84 kg/m        Physical Exam:  General Appearance: Alert, cooperative, no distress, appears stated age  Head: Normocephalic, without obvious abnormality, atraumatic  Eyes: PERRL, conjunctiva/corneas clear, EOM's intact  Throat: Lips, mucosa, and tongue normal  Neck: Supple, symmetrical, trachea midline, no adenopathy;  thyroid: not enlarged   Lungs: Clear to auscultation bilaterally, respirations unlabored  Breasts: No breast masses, tenderness, asymmetry, or nipple discharge.  Heart: Regular rate and rhythm, S1 and S2 normal   Abdomen: Soft, non-tender, uterus firm  Pelvic:Normally developed genitalia with no external lesions or eruptions. Vagina shows well healing laceration, no inflammation, discharge or tenderness. No cystocele, No rectocele. Uterus firm and below umbilicus.  No adnexal mass or tenderness.  Extremities: Extremities normal, atraumatic, no cyanosis or edema  Skin: Skin color, texture, turgor normal, no rashes or lesions  Lymph nodes: Cervical, supraclavicular, and axillary nodes normal  Neurologic: Normal     "

## 2023-07-17 LAB
HUMAN PAPILLOMA VIRUS 16 DNA: NEGATIVE
HUMAN PAPILLOMA VIRUS 18 DNA: NEGATIVE
HUMAN PAPILLOMA VIRUS FINAL DIAGNOSIS: ABNORMAL
HUMAN PAPILLOMA VIRUS OTHER HR: POSITIVE

## 2023-07-18 ENCOUNTER — OFFICE VISIT (OUTPATIENT)
Dept: FAMILY MEDICINE | Facility: CLINIC | Age: 38
End: 2023-07-18
Payer: COMMERCIAL

## 2023-07-18 ENCOUNTER — PATIENT OUTREACH (OUTPATIENT)
Dept: FAMILY MEDICINE | Facility: CLINIC | Age: 38
End: 2023-07-18

## 2023-07-18 VITALS
HEART RATE: 67 BPM | WEIGHT: 205.13 LBS | BODY MASS INDEX: 31.09 KG/M2 | OXYGEN SATURATION: 97 % | DIASTOLIC BLOOD PRESSURE: 62 MMHG | HEIGHT: 68 IN | SYSTOLIC BLOOD PRESSURE: 118 MMHG | TEMPERATURE: 97.7 F | RESPIRATION RATE: 12 BRPM

## 2023-07-18 DIAGNOSIS — Z30.430 ENCOUNTER FOR INSERTION OF INTRAUTERINE CONTRACEPTIVE DEVICE: ICD-10-CM

## 2023-07-18 DIAGNOSIS — D06.9 CIN III WITH SEVERE DYSPLASIA: Primary | ICD-10-CM

## 2023-07-18 DIAGNOSIS — Z30.430 ENCOUNTER FOR IUD INSERTION: Primary | ICD-10-CM

## 2023-07-18 LAB — HCG UR QL: NEGATIVE

## 2023-07-18 PROCEDURE — 81025 URINE PREGNANCY TEST: CPT | Performed by: FAMILY MEDICINE

## 2023-07-18 PROCEDURE — 58300 INSERT INTRAUTERINE DEVICE: CPT | Performed by: FAMILY MEDICINE

## 2023-07-18 NOTE — PROGRESS NOTES
IUD Insertion Procedure Note    Pre-operative Diagnosis: contraception desired    Post-operative Diagnosis: contraception placed    Indications: contraception    Procedure Details   Urine pregnancy test was done today and result was negative.  The risks (including infection, bleeding, pain, and uterine perforation) and benefits of the procedure were explained to the patient and Written informed consent was obtained.      Cervix cleansed with Betadine. Uterus sounded to 7.5 cm. IUD inserted without difficulty under sterile techniquw. String visible and trimmed. Patient tolerated procedure well.    IUD Information:  Mirena.    Condition:  Stable    Complications:  None    Plan:    The patient was advised to call for any fever or for prolonged or severe pain or bleeding. She was advised to use NSAID as needed for mild to moderate pain.     Answers for HPI/ROS submitted by the patient on 7/18/2023  What is the reason for your visit today? : IUD insertion  How many servings of fruits and vegetables do you eat daily?: 2-3  On average, how many sweetened beverages do you drink each day (Examples: soda, juice, sweet tea, etc.  Do NOT count diet or artificially sweetened beverages)?: 0  How many minutes a day do you exercise enough to make your heart beat faster?: 10 to 19  How many days a week do you exercise enough to make your heart beat faster?: 3 or less  How many days per week do you miss taking your medication?: 0

## 2023-08-01 ENCOUNTER — TRANSFERRED RECORDS (OUTPATIENT)
Dept: MULTI SPECIALTY CLINIC | Facility: CLINIC | Age: 38
End: 2023-08-01

## 2023-08-01 LAB — PAP SMEAR - HIM PATIENT REPORTED: NORMAL

## 2024-02-04 ENCOUNTER — MYC MEDICAL ADVICE (OUTPATIENT)
Dept: FAMILY MEDICINE | Facility: CLINIC | Age: 39
End: 2024-02-04
Payer: COMMERCIAL

## 2024-02-05 ENCOUNTER — NURSE TRIAGE (OUTPATIENT)
Dept: FAMILY MEDICINE | Facility: CLINIC | Age: 39
End: 2024-02-05
Payer: COMMERCIAL

## 2024-02-05 DIAGNOSIS — U07.1 INFECTION DUE TO 2019 NOVEL CORONAVIRUS: Primary | ICD-10-CM

## 2024-02-05 NOTE — TELEPHONE ENCOUNTER
RN COVID TREATMENT VISIT  02/05/24    The patient has been triaged and does not require a higher level of care.    Emeli Powell  38 year old  Current weight? 205    Has the patient been seen by a primary care provider at an [facility] or [facility] within the past two years? Yes.   Have you been in close proximity to/do you have a known exposure to a person with a confirmed case of influenza? No.     General treatment eligibility:  Date of positive COVID test (PCR or at home)?  2/4/24    Are you or have you been hospitalized for this COVID-19 infection? No.   Have you received monoclonal antibodies or antiviral treatment for COVID-19 since this positive test? No.   Do you have any of the following conditions that place you at risk of being very sick from COVID-19?   - Overweight or Obesity (BMI >85th percentile or BMI 25 or higher)  Yes, patient has at least one high risk condition as noted above.     Current COVID symptoms:   - fever or chills  - cough  - headache  - congestion or runny nose  Yes. Patient has at least one symptom as selected.     How many days since symptoms started? 5 days or less. Established patient, 12 years or older weighing at least 88.2 lbs, who has symptoms that started in the past 5 days, has not been hospitalized nor received treatment already, and is at risk for being very sick from COVID-19.     Treatment eligibility by RN:  Are you currently pregnant or nursing? No  Do you have a clinically significant hypersensitivity to nirmatrelvir or ritonavir, or toxic epidermal necrolysis (TEN) or Fowler-Deven Syndrome? No  Do you have a history of hepatitis, any hepatic impairment on the Problem List (such as Child-Lobo Class C, cirrhosis, fatty liver disease, alcoholic liver disease), or was the last liver lab (hepatic panel, ALT, AST, ALK Phos, bilirubin) elevated in the past 6 months? No  Do you have any history of severe renal impairment (eGFR <  30mL/min)? No    Is patient eligible to continue? Yes, patient meets all eligibility requirements for the RN COVID treatment (as denoted by all no responses above).     Current Outpatient Medications   Medication Sig Dispense Refill    metroNIDAZOLE (METROGEL) 0.75 % vaginal gel Place 1 applicator (5 g) vaginally daily (Patient not taking: Reported on 7/18/2023) 70 g 0    Prenatal Vit-Fe Fumarate-FA (PRENATAL COMPLETE PO) Take by mouth daily         Medications from List 1 of the standing order (on medications that exclude the use of Paxlovid) that patient is taking: NONE. Is patient taking Baldemar's Wort? No  Is patient taking Baldemar's Wort or any meds from List 1? No.   Medications from List 2 of the standing order (on meds that provider needs to adjust) that patient is taking: NONE. Is patient on any of the meds from List 2? No.   Medications from List 3 of standing order (on meds that a RN needs to adjust) that patient is taking: NONE. Is patient on any meds from List 3? No.     Paxlovid has an approximate 90% reduction in hospitalization. Paxlovid can possibly cause altered sense of taste, diarrhea (loose, watery stools), high blood pressure, muscle aches.     Would patient like a Paxlovid prescription?   Yes.   Lab Results   Component Value Date    GFRESTIMATED >90 06/07/2023       Was last eGFR reduced? No, eGFR 60 or greater/ No Result on record. Patient can receive the normal renal function dose. Paxlovid Rx sent to [facility] in [place]    Temporary change to home medications: None    All medication adjustments (holds, etc) were discussed with the patient and patient was asked to repeat back (teachback) their med adjustment.  Did patient understand med adjustment? No medication adjustments needed.         Reviewed the following instructions with the patient:    Paxlovid (nimatrelvir and ritonavir)    How it works  Two medicines (nirmatrelvir and ritonavir) are taken  together. They stop the virus from growing. Less amount of virus is easier for your body to fight.    How to take  Medicine comes in a daily container with both medicine tablets. Take by mouth twice daily (once in the morning, once at night) for 5 days.  The number of tablets to take varies by patient.  Don't chew or break capsules. Swallow whole.    When to take  Take as soon as possible after positive COVID-19 test result, and within 5 days of your first symptoms.    Possible side effects  Can cause altered sense of taste, diarrhea (loose, watery stools), high blood pressure, muscle aches.    Amanda Pollard RN       Reason for Disposition   [1] HIGH RISK patient (e.g., weak immune system, age > 64 years, obesity with BMI 30 or higher, pregnant, chronic lung disease or other chronic medical condition) AND [2] COVID symptoms (e.g., cough, fever)  (Exceptions: Already seen by PCP and no new or worsening symptoms.)    Additional Information   Negative: SEVERE difficulty breathing (e.g., struggling for each breath, speaks in single words)   Negative: Difficult to awaken or acting confused (e.g., disoriented, slurred speech)   Negative: Bluish (or gray) lips or face now   Negative: Shock suspected (e.g., cold/pale/clammy skin, too weak to stand, low BP, rapid pulse)   Negative: Sounds like a life-threatening emergency to the triager   Negative: [1] Diagnosed or suspected COVID-19 AND [2] symptoms lasting 3 or more weeks   Negative: [1] COVID-19 exposure AND [2] no symptoms   Negative: COVID-19 vaccine reaction suspected (e.g., fever, headache, muscle aches) occurring 1 to 3 days after getting vaccine   Negative: COVID-19 vaccine, questions about   Negative: [1] Lives with someone known to have influenza (flu test positive) AND [2] flu-like symptoms (e.g., cough, runny nose, sore throat, SOB; with or without fever)   Negative: [1] Possible COVID-19 symptoms AND [2] triager concerned about severity of symptoms or other  causes   Negative: COVID-19 and breastfeeding, questions about   Negative: SEVERE or constant chest pain or pressure  (Exception: Mild central chest pain, present only when coughing.)   Negative: MODERATE difficulty breathing (e.g., speaks in phrases, SOB even at rest, pulse 100-120)   Negative: [1] Headache AND [2] stiff neck (can't touch chin to chest)   Negative: Oxygen level (e.g., pulse oximetry) 90 percent or lower   Negative: Chest pain or pressure  (Exception: MILD central chest pain, present only when coughing.)   Negative: [1] Drinking very little AND [2] dehydration suspected (e.g., no urine > 12 hours, very dry mouth, very lightheaded)   Negative: Patient sounds very sick or weak to the triager   Negative: MILD difficulty breathing (e.g., minimal/no SOB at rest, SOB with walking, pulse <100)   Negative: Fever > 103 F (39.4 C)   Negative: [1] Fever > 101 F (38.3 C) AND [2] age > 60 years   Negative: [1] Fever > 100.0 F (37.8 C) AND [2] bedridden (e.g., CVA, chronic illness, recovering from surgery)   Negative: Oxygen level (e.g., pulse oximetry) 91 to 94 percent    Protocols used: Coronavirus (COVID-19) Diagnosed or [id number]

## 2024-02-17 ENCOUNTER — HOSPITAL ENCOUNTER (EMERGENCY)
Facility: CLINIC | Age: 39
Discharge: HOME | End: 2024-02-17
Attending: EMERGENCY MEDICINE | Admitting: EMERGENCY MEDICINE
Payer: COMMERCIAL

## 2024-02-17 VITALS
HEIGHT: 68 IN | TEMPERATURE: 97.6 F | DIASTOLIC BLOOD PRESSURE: 76 MMHG | OXYGEN SATURATION: 98 % | HEART RATE: 70 BPM | BODY MASS INDEX: 29.55 KG/M2 | WEIGHT: 195 LBS | RESPIRATION RATE: 18 BRPM | SYSTOLIC BLOOD PRESSURE: 122 MMHG

## 2024-02-17 DIAGNOSIS — R55 SYNCOPE, UNSPECIFIED SYNCOPE TYPE: ICD-10-CM

## 2024-02-17 LAB
ANION GAP SERPL CALCULATED.3IONS-SCNC: 10 MMOL/L (ref 7–15)
BASOPHILS # BLD AUTO: 0 10E3/UL (ref 0–0.2)
BASOPHILS NFR BLD AUTO: 0 %
BUN SERPL-MCNC: 14.5 MG/DL (ref 6–20)
CALCIUM SERPL-MCNC: 9.5 MG/DL (ref 8.6–10)
CHLORIDE SERPL-SCNC: 103 MMOL/L (ref 98–107)
CREAT SERPL-MCNC: 0.8 MG/DL (ref 0.51–0.95)
DEPRECATED HCO3 PLAS-SCNC: 27 MMOL/L (ref 22–29)
EGFRCR SERPLBLD CKD-EPI 2021: >90 ML/MIN/1.73M2
EOSINOPHIL # BLD AUTO: 0.1 10E3/UL (ref 0–0.7)
EOSINOPHIL NFR BLD AUTO: 1 %
ERYTHROCYTE [DISTWIDTH] IN BLOOD BY AUTOMATED COUNT: 11.6 % (ref 10–15)
GLUCOSE SERPL-MCNC: 101 MG/DL (ref 70–99)
HCG SERPL QL: NEGATIVE
HCT VFR BLD AUTO: 40.7 % (ref 35–47)
HGB BLD-MCNC: 13.8 G/DL (ref 11.7–15.7)
IMM GRANULOCYTES # BLD: 0.1 10E3/UL
IMM GRANULOCYTES NFR BLD: 1 %
LYMPHOCYTES # BLD AUTO: 1.8 10E3/UL (ref 0.8–5.3)
LYMPHOCYTES NFR BLD AUTO: 22 %
MCH RBC QN AUTO: 30.3 PG (ref 26.5–33)
MCHC RBC AUTO-ENTMCNC: 33.9 G/DL (ref 31.5–36.5)
MCV RBC AUTO: 89 FL (ref 78–100)
MONOCYTES # BLD AUTO: 0.4 10E3/UL (ref 0–1.3)
MONOCYTES NFR BLD AUTO: 5 %
NEUTROPHILS # BLD AUTO: 5.9 10E3/UL (ref 1.6–8.3)
NEUTROPHILS NFR BLD AUTO: 71 %
NRBC # BLD AUTO: 0 10E3/UL
NRBC BLD AUTO-RTO: 0 /100
PLATELET # BLD AUTO: 315 10E3/UL (ref 150–450)
POTASSIUM SERPL-SCNC: 4.1 MMOL/L (ref 3.4–5.3)
RBC # BLD AUTO: 4.56 10E6/UL (ref 3.8–5.2)
SODIUM SERPL-SCNC: 140 MMOL/L (ref 135–145)
WBC # BLD AUTO: 8.2 10E3/UL (ref 4–11)

## 2024-02-17 PROCEDURE — 99284 EMERGENCY DEPT VISIT MOD MDM: CPT | Mod: 25 | Performed by: EMERGENCY MEDICINE

## 2024-02-17 PROCEDURE — 36415 COLL VENOUS BLD VENIPUNCTURE: CPT | Performed by: EMERGENCY MEDICINE

## 2024-02-17 PROCEDURE — 80048 BASIC METABOLIC PNL TOTAL CA: CPT | Performed by: EMERGENCY MEDICINE

## 2024-02-17 PROCEDURE — 93308 TTE F-UP OR LMTD: CPT | Mod: 26 | Performed by: EMERGENCY MEDICINE

## 2024-02-17 PROCEDURE — 93010 ELECTROCARDIOGRAM REPORT: CPT | Performed by: EMERGENCY MEDICINE

## 2024-02-17 PROCEDURE — 93308 TTE F-UP OR LMTD: CPT | Performed by: EMERGENCY MEDICINE

## 2024-02-17 PROCEDURE — 84443 ASSAY THYROID STIM HORMONE: CPT

## 2024-02-17 PROCEDURE — 83735 ASSAY OF MAGNESIUM: CPT

## 2024-02-17 PROCEDURE — 93005 ELECTROCARDIOGRAM TRACING: CPT | Performed by: EMERGENCY MEDICINE

## 2024-02-17 PROCEDURE — 85025 COMPLETE CBC W/AUTO DIFF WBC: CPT | Performed by: EMERGENCY MEDICINE

## 2024-02-17 PROCEDURE — 84703 CHORIONIC GONADOTROPIN ASSAY: CPT | Performed by: EMERGENCY MEDICINE

## 2024-02-17 ASSESSMENT — ACTIVITIES OF DAILY LIVING (ADL)
ADLS_ACUITY_SCORE: 35
ADLS_ACUITY_SCORE: 35

## 2024-02-17 NOTE — ED TRIAGE NOTES
Felt weak this morning after getting up and had syncopal episode  Recent covid diagnosis  2/4     Triage Assessment (Adult)       Row Name 02/17/24 0709          Triage Assessment    Airway WDL WDL        Respiratory WDL    Respiratory WDL WDL        Skin Circulation/Temperature WDL    Skin Circulation/Temperature WDL WDL        Peripheral/Neurovascular WDL    Peripheral Neurovascular WDL WDL        Cognitive/Neuro/Behavioral WDL    Cognitive/Neuro/Behavioral WDL WDL

## 2024-02-17 NOTE — ED PROVIDER NOTES
History     Chief Complaint   Patient presents with    Syncope     HPI  Emeli Powell is a 38 year old female with history notable for recent COVID infection with positive test 13 days ago who presents for evaluation of syncopal event.  COVID-positive on 2024 feeling better.  This morning she said she did the been feeling dizzy and lightheaded.  Did not have any nausea or vomiting or vision changes but definitely felt as if she was going to pass out.  Was up changing her infant son walking back into the room should she lost consciousness but workup on her knees.  Unclear if she had complete loss of consciousness or not.  Denies any pain or injuries.  Has had syncopal events in the past and says she was worked up for this several years ago and did see a cardiologist.  No findings to explain her syncope.  Not on any medications.  No recent vomiting or diarrhea and is eating and drinking well.    The patient's PMHx, Surgical Hx, Allergies, and Medications were all reviewed with the patient.    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   All above results found in CE  21 NIL pap, neg HPV. Plan cotest in 1 year due bef 22.  22 NIL pap, neg HPV. Plan: cotest in 1 year  7/10/23 NIL pap, +HR HPV (not 16/18). Plan: colposcopy due before 10/10/23  7/18/23 Pt notified   23 Reminder mychart  10/3/23 [place] not done. Tracking updated for 6 mo colp/pap due 1/10/24.    23 Reminder mychart  24 Reminder call --  "left message          Past Medical History:    Past Medical History:   Diagnosis Date    Cervical high risk HPV (human papillomavirus) test positive        Past Surgical History:    No past surgical history on file.    Family History:    Family History   Problem Relation Age of Onset    No Known Problems Mother     No Known Problems Father     Colon Cancer Sister     Diabetes Mother     Heart Disease Paternal Grandmother        Social History:  Marital Status:   [2]  Social History     Tobacco Use    Smoking status: Never    Smokeless tobacco: Never   Vaping Use    Vaping Use: Never used   Substance Use Topics    Alcohol use: Not Currently    Drug use: Never        Medications:    metroNIDAZOLE (METROGEL) 0.75 % vaginal gel  Prenatal Vit-Fe Fumarate-FA (PRENATAL COMPLETE PO)          Review of Systems  Pertinent positives and negatives mentioned in HPI    Physical Exam   BP: 139/80  Pulse: 76  Temp: 97.6  F (36.4  C)  Resp: 18  Height: 172.7 cm (5' 8\")  Weight: 88.5 kg (195 lb)  SpO2: 98 %    Physical Exam  GEN: Awake, alert, and cooperative. No acute distress.  Resting comfortably  HENT: MMM. External ears and nose normal bilaterally.  Atraumatic  EYES: EOM intact. Conjunctiva clear. No discharge.  No RAPD  NECK: Symmetric, freely mobile.  Nontender  CV : Regular rate and rhythm.  No murmurs appreciated  PULM: Normal effort. No wheezes, rales, or rhonchi bilaterally.  ABD: Soft, non-tender, non-distended. No rebound or guarding.   NEURO: Normal speech. Following commands. Answering questions and interacting appropriately.  Steady gait  EXT: No gross deformity. Warm and well perfused.  INT: Warm. No diaphoresis. Normal color.     ED Course        Procedures         EKG: Interpreted by Domenico Edwards MD sinus rhythm with rate of 76 bpm.  Normal axis.  Normal R wave progression.  Normal intervals.  No ectopy.  Downsloping T wave inversion in lead III.  This finding was present on EKG from 2019.  Normal " intervals.  No signs of WPW, Brugada, or ARVD.  Impression sinus rhythm with no acute changes.    Critical Care time:  none  Medications - No data to display  Labs Ordered and Resulted from Time of ED Arrival to Time of ED Departure   BASIC METABOLIC PANEL - Abnormal       Result Value    Sodium 140      Potassium 4.1      Chloride 103      Carbon Dioxide (CO2) 27      Anion Gap 10      Urea Nitrogen 14.5      Creatinine 0.80      GFR Estimate >90      Calcium 9.5      Glucose 101 (*)    HCG QUALITATIVE PREGNANCY - Normal    hCG Serum Qualitative Negative     CBC WITH PLATELETS AND DIFFERENTIAL    WBC Count 8.2      RBC Count 4.56      Hemoglobin 13.8      Hematocrit 40.7      MCV 89      MCH 30.3      MCHC 33.9      RDW 11.6      Platelet Count 315      % Neutrophils 71      % Lymphocytes 22      % Monocytes 5      % Eosinophils 1      % Basophils 0      % Immature Granulocytes 1      NRBCs per 100 WBC 0      Absolute Neutrophils 5.9      Absolute Lymphocytes 1.8      Absolute Monocytes 0.4      Absolute Eosinophils 0.1      Absolute Basophils 0.0      Absolute Immature Granulocytes 0.1      Absolute NRBCs 0.0       POC US ECHO LIMITED   Final Result   [facility] Procedure Note        Limited Bedside ED Cardiac Ultrasound:      PROCEDURE: PERFORMED BY: Dr. Domenico Edwards MD   INDICATIONS/SYMPTOM:  syncope   PROBE: Cardiac phased array probe   BODY LOCATION: Chest   FINDINGS:    The ultrasound was performed utilizing the subcostal, parasternal long axis, parasternal short axis, and apical 4 chamber views.   Cardiac contractility:  Present   Gross estimation of cardiac kinesis: normal   Pericardial Effusion:  None   RV:LV ratio: LV > RV      INTERPRETATION:    Chamber size and motion were grossly normal with LV > RV, normal cardiac kinesis.  No pericardial effusion was found.        IMAGE DOCUMENTATION: Images were archived to PACs system.                    Assessments & Plan (with Medical Decision  Making)   38 year old female with past medical history of syncope (remote) with syncopal event at home with prodromal symptoms detailed in HPI.  On exam she is well-appearing and no signs of acute distress.  Nonfocal and reassuring examination detailed above.  EKG without evidence of prolongation of QT, ARVD, Brugada, WPW, or other arrhythmia to cause possible cardiac syncope.  Given that she had prodromal symptoms I think this is most likely representing a vasovagal event.  She appears to be euvolemic and no reported recent GI losses however she recently recovered from SARS-CoV-2 infection.  She has no chest pain or palpitations.  No clinical signs of DVT and reassuring vital signs.  UPT is negative.  Point-of-care ultrasound without any pericardial effusion or signs of elevated right-sided pressures to suggest VTE as cause of her syncope.  Basic laboratory work also reassuring.  Okay to discharge and have follow-up with her primary care provider.  ED return precautions discussed.         I have reviewed the nursing notes.         New Prescriptions    No medications on file       Final diagnoses:   Syncope, unspecified syncope type     Domenico Edwards MD        2/17/2024   [facility] [place] EMERGENCY DEPT    Disclaimer: This note consists of words and symbols derived from keyboarding and dictation using voice recognition software.  As a result, there may be errors that have gone undetected.  Please consider this when interpreting information found in this note.               Domenico Edwards MD  02/19/24 [id number]

## 2024-02-17 NOTE — DISCHARGE INSTRUCTIONS
Your evaluation in the emergency department was reassuring.     Follow up with your primary care provider this next week.     Return to ED if you have another event of passing out, develop chest pain, palpitations, or shortness or breath.

## 2024-02-20 ENCOUNTER — OFFICE VISIT (OUTPATIENT)
Dept: FAMILY MEDICINE | Facility: CLINIC | Age: 39
End: 2024-02-20
Payer: COMMERCIAL

## 2024-02-20 VITALS
DIASTOLIC BLOOD PRESSURE: 70 MMHG | HEIGHT: 68 IN | HEART RATE: 70 BPM | TEMPERATURE: 97.3 F | BODY MASS INDEX: 30.65 KG/M2 | RESPIRATION RATE: 12 BRPM | WEIGHT: 202.25 LBS | SYSTOLIC BLOOD PRESSURE: 132 MMHG | OXYGEN SATURATION: 98 %

## 2024-02-20 DIAGNOSIS — R55 SYNCOPE, UNSPECIFIED SYNCOPE TYPE: Primary | ICD-10-CM

## 2024-02-20 LAB
MAGNESIUM SERPL-MCNC: 2 MG/DL (ref 1.7–2.3)
TSH SERPL DL<=0.005 MIU/L-ACNC: 1.67 UIU/ML (ref 0.3–4.2)

## 2024-02-20 PROCEDURE — 99214 OFFICE O/P EST MOD 30 MIN: CPT | Performed by: FAMILY MEDICINE

## 2024-02-20 NOTE — PROGRESS NOTES
Assessment/Plan:    Emeli Powell is a 38 year old female presenting for:    Syncope, unspecified syncope type  Etiology for syncope is somewhat unclear.  It sounds as though she had a prodromal syndrome.  Suspect that this was likely more due to a vasovagal type of episode.  It is interesting that this has happened once before in 2019 and then twice before when she was in college.  They did do a bedside echocardiogram in the ER but I think it would be worth doing a formal echocardiogram and this will be done.  Reviewed EKG.  Added on TSH and magnesium which were normal along with all of her other normal blood testing.    Referral to cardiology placed.  Discussed with the patient that generally a syncope that is related to an arrhythmia that does not have prodromal symptoms.  Patient is aware of this.    Because she always has prodromal symptoms with this I do not think that she needs to change her lifestyle at this point.  No seizure type activity.  Could have her see neurology as well although not sure that they would have much to add.  - TSH with free T4 reflex  - Magnesium  - Echocardiogram Complete  - Adult Cardiology Eval [facility] Referral      Medications Discontinued During This Encounter   Medication Reason    Prenatal Vit-Fe Fumarate-FA (PRENATAL COMPLETE PO) Therapy completed (No AVS)    metroNIDAZOLE (METROGEL) 0.75 % vaginal gel Therapy completed (No AVS)           Chief Complaint:  ER F/U        Subjective:   Emeli Powell is a pleasant 38-year-old female who presents to the clinic today for follow-up of an ER visit for syncope.    As a brief history, patient had 2 episodes of syncope when she was in college.  1 when she was walking in [employer] 1 when she was standing at a concert.  In 2019 she had an episode of syncope while she was sitting down and eating pizza.  At that time she was seen in the emergency department with a normal workup.  She states that she believes she saw a cardiologist  "in the distant past for her episodes in college.    This past weekend she was awoken early in the morning by her infant to change his diaper.  She was changing his diaper began to feel a bit lightheaded.  She had a difficult time getting his onesie sit back up.  She then noted she felt as though she was going to pass out.  She did not want to leave her baby on the changing table so picked him up and was able to set him down.  She came to and she was on her knees and her baby was sitting next to her.    She does not feel confused afterwards.  She was able to  her child and grab some milk in a bottle from the fridge.  She then was able to open her garage door and called her neighbor as her  was not home.    She was seen in the emergency department.  EKG was normal.  CBC and BMP were normal.  Urine pregnancy test was negative.  A bedside cardiac ultrasound was normal.    She had not been sick at the time but did test positive for COVID at the very beginning of this month.  She states she has a slight residual cough but nothing more significant than that.    12 point review of systems completed and negative except for what has been described above    History   Smoking Status    Never   Smokeless Tobacco    Never       No current outpatient medications on file.      Objective:  Vitals:    02/20/24 1039   BP: 132/70   Pulse: 70   Resp: 12   Temp: 97.3  F (36.3  C)   TempSrc: Tympanic   SpO2: 98%   Weight: 91.7 kg (202 lb 4 oz)   Height: 1.727 m (5' 8\")       Body mass index is 30.75 kg/m .    Vital signs reviewed and stable  General: No acute distress  Psych: Appropriate affect  HEENT: moist mucous membranes, pupils equal, round, reactive to light and accomodation, tympanic membranes are pearly grey bilaterally  Lymph: no cervical or supraclavicular lymphadenopathy  Cardiovascular: regular rate and rhythm with no murmur  Pulmonary: clear to auscultation bilaterally with no wheeze  Abdomen: soft, non tender, " non distended with normo-active bowel sounds  Extremities: warm and well perfused with no edema  Skin: warm and dry with no rash         This note has been dictated and transcribed using voice recognition software.   Any errors in transcription are unintentional and inherent to the software.

## 2024-02-21 NOTE — TELEPHONE ENCOUNTER
FYI to provider - Patient is lost to pap tracking follow-up. Attempts to contact pt have been made per reminder process and there has been no reply and/or no appt scheduled. Contact hx listed below.     5/10/06 Colpo NERIS 2 and 3  2011 NIL pap  3/8/17 NIL pap  5/1/20 NIL pap, +HR HPV (not 16 or 18).   All above results found in CE  5/21/21 NIL pap, neg HPV. Plan cotest in 1 year due bef 5/21/22.  4/13/22 NIL pap, neg HPV. Plan: cotest in 1 year  7/10/23 NIL pap, +HR HPV (not 16/18). Plan: colposcopy due before 10/10/23  7/18/23 Pt notified   9/5/23 Reminder mychart  10/3/23 [place] not done. Tracking updated for 6 mo colp/pap due 1/10/24.    12/27/23 Reminder mychart  1/19/24 Reminder call -- left message  2/21/24 Lost to follow-up for pap tracking     Kiara Hernandez RN BSN, Pap Tracking

## 2024-02-29 ENCOUNTER — HOSPITAL ENCOUNTER (OUTPATIENT)
Dept: CARDIOLOGY | Facility: CLINIC | Age: 39
Discharge: HOME | End: 2024-02-29
Attending: FAMILY MEDICINE | Admitting: FAMILY MEDICINE
Payer: COMMERCIAL

## 2024-02-29 DIAGNOSIS — R55 SYNCOPE, UNSPECIFIED SYNCOPE TYPE: ICD-10-CM

## 2024-02-29 LAB — LVEF ECHO: NORMAL

## 2024-02-29 PROCEDURE — 93306 TTE W/DOPPLER COMPLETE: CPT

## 2024-02-29 PROCEDURE — 93306 TTE W/DOPPLER COMPLETE: CPT | Mod: 26 | Performed by: INTERNAL MEDICINE

## 2024-03-06 ENCOUNTER — OFFICE VISIT (OUTPATIENT)
Dept: CARDIOLOGY | Facility: CLINIC | Age: 39
End: 2024-03-06
Attending: FAMILY MEDICINE
Payer: COMMERCIAL

## 2024-03-06 VITALS
RESPIRATION RATE: 16 BRPM | SYSTOLIC BLOOD PRESSURE: 130 MMHG | OXYGEN SATURATION: 97 % | HEART RATE: 70 BPM | WEIGHT: 204 LBS | DIASTOLIC BLOOD PRESSURE: 75 MMHG | BODY MASS INDEX: 31.02 KG/M2

## 2024-03-06 DIAGNOSIS — R55 VASOVAGAL SYNCOPE: Primary | ICD-10-CM

## 2024-03-06 PROCEDURE — 99203 OFFICE O/P NEW LOW 30 MIN: CPT | Performed by: INTERNAL MEDICINE

## 2024-03-06 NOTE — PROGRESS NOTES
Click to link to [facility] HEART CARE NOTE    Thank you, Dr. Allen, for asking me to see Emeli Powell in consultation at [facility] to evaluate syncope.      Assessment/Plan:   Vasovagal syncope: The patient had 5 episodes of syncope in her life.  3 syncopes during college, 1 syncope 5 years ago, 1 syncope last months after COVID infection.  She had lightheadedness, dizziness prior to syncopal episode each time, diaphoretic presentation post syncope.  She had no other symptoms including chest pain, shortness of breath, palpitations, seizure.  Her laboratory test was normal, ECG no significant change, echocardiogram normal heart function and structure.  Put all together, her syncope most likely was caused by vasovagal syncope.  We discussed the management of vasovagal syncope.  If she has more frequent episode, she will call me back and then consider cardiac monitor.  Otherwise continue to monitor her.  She agreed with the plan.    Thank you for the opportunity to be involved in the care of Emeli Powell. If you have any questions, please feel free to contact me.  I will see the patient again in 6 months and as needed    Much or all of the text in this note was generated through the use of Dragon Dictate voice-to-text software. Errors in spelling or words which seem out of context are unintentional.   Sound alike errors, in particular, may have escaped editing.       History of Present Illness:   It is my pleasure to see Emeli Powell at the [facility] for evaluation of syncope. Emeli Powell is a 38 year old female without significant past medical history except for mild overweight.    She is referred to cardiology clinic For evaluation of her syncope.  She states that she had 3 episode of syncope during college.  She had another episode 5 years ago.  Last months she got COVID infection.  After 2 weeks of COVID  infection, she had an episode of her syncope.  She described her syncope as started lightheaded, dizziness and then passed out for short time.  When she woke up, she was diaphoretic.  She was not confused after she woke up.  She denies chest pain, shortness of breath, palpitations prior to or post syncopal episode.  She had no seizure presentation.  She went to ER for evaluation on 2024.  Her ER evaluation was not significant.  ECG has no ischemic change, no conduction abnormality, no other abnormality such as Brugada.  Echocardiogram was reported normal heart function and structure.  She has no chronic medical conditions.    Past Medical History:     Patient Active Problem List   Diagnosis    Routine postpartum follow-up    NERIS III with severe dysplasia     (normal spontaneous vaginal delivery)    Pregnant    Encounter for triage in pregnant patient       Past Surgical History:   No past surgical history on file.    Family History:     Family History   Problem Relation Age of Onset    No Known Problems Mother     No Known Problems Father     Colon Cancer Sister     Diabetes Mother     Heart Disease Paternal Grandmother        Social History:    reports that she has never smoked. She has never used smokeless tobacco. She reports that she does not currently use alcohol. She reports that she does not use drugs.    Review of Systems:   12 systems are reviewed negative except for in HPI.    Meds:   No current outpatient medications on file.     Allergies:   Patient has no known allergies.    Objective:      Physical Exam  92.5 kg (204 lb)     Body mass index is 31.02 kg/m .  /75 (BP Location: Left arm, Patient Position: Sitting, Cuff Size: Adult Regular)   Pulse 70   Resp 16   Wt 92.5 kg (204 lb)   LMP  (LMP Unknown)   SpO2 97%   BMI 31.02 kg/m      General Appearance:   Awake, Alert, No acute distress.   HEENT:  Pupil equal, reactive to light. No scleral icterus; the mucous membranes were  moist. No oral ulcers or thrush.    Neck: No cervical bruits. No JVD. No thyromegaly. No lymph node enlargement or tenderness.   Chest: The spine was straight. The chest was symmetric.   Lungs:   Respirations unlabored. Lungs are clear to auscultation. No crackles. No wheezing.   Cardiovascular:   RRR, normal first and second heart sounds with no murmurs. No rubs or gallops.    Abdomen:  Obese.  Soft. No tenderness. Non-distended. Bowels sounds are present   Extremities: Equal posterior tibial pulses. No leg edema.   Skin: No rashes or ulcers. Warm, Dry.   Musculoskeletal: No tenderness. No deformity.   Neurologic: Mood and affect are appropriate. No focal deficits.         EKG:  Personally reivewed  Sinus rhythm  No ischemic ST-T change  Mild interventricular conduction delay    Cardiac Imaging Studies  Echo on February 29, 2024:  1. The left ventricle is normal in size. There is normal left ventricular wall  thickness. Left ventricular systolic function is normal. The visual ejection  fraction is 55-60%. Diastolic Doppler findings (E/E' ratio and/or other  parameters) suggest left ventricular filling pressures are normal. No regional  wall motion abnormalities noted.  2. The right ventricle is normal size. The right ventricular systolic function is normal.  3. Trace mitral and tricuspid regurgitation.  4. No pericardial effusion.  5. No previous study for comparison.    Lab Review   Lab Results   Component Value Date     02/17/2024     09/21/2019    CO2 27 02/17/2024    CO2 29 04/13/2022    CO2 27 09/21/2019    BUN 14.5 02/17/2024    BUN 16 04/13/2022    BUN 15 09/21/2019     Lab Results   Component Value Date    WBC 8.2 02/17/2024    WBC 8.7 09/21/2019    HGB 13.8 02/17/2024    HGB 13.8 09/21/2019    HCT 40.7 02/17/2024    HCT 40.4 09/21/2019    MCV 89 02/17/2024    MCV 91 09/21/2019     02/17/2024     09/21/2019     Lab Results   Component Value Date    CHOL 246 02/10/2023    TRIG 105  02/10/2023    HDL 93 02/10/2023     02/10/2023     LDL Cholesterol Calculated   Date Value Ref Range Status   02/10/2023 132 (H) <=100 mg/dL Final     Lab Results   Component Value Date    TSH 1.67 02/17/2024    TSH 1.59 04/13/2022

## 2024-03-06 NOTE — LETTER
3/6/2024    Jess[place] Silverman [place]  [id number] [id number]    RE: Emeli Powell       Dear Colleague,     I had the pleasure of seeing Emeli Powell in the [facility].      Click to link to [facility] HEART CARE NOTE    Thank you, Dr. Allen, for asking me to see Emeli Powell in consultation at [facility] to evaluate syncope.      Assessment/Plan:   Vasovagal syncope: The patient had 5 episodes of syncope in her life.  3 syncopes during college, 1 syncope 5 years ago, 1 syncope last months after COVID infection.  She had lightheadedness, dizziness prior to syncopal episode each time, diaphoretic presentation post syncope.  She had no other symptoms including chest pain, shortness of breath, palpitations, seizure.  Her laboratory test was normal, ECG no significant change, echocardiogram normal heart function and structure.  Put all together, her syncope most likely was caused by vasovagal syncope.  We discussed the management of vasovagal syncope.  If she has more frequent episode, she will call me back and then consider cardiac monitor.  Otherwise continue to monitor her.  She agreed with the plan.    Thank you for the opportunity to be involved in the care of Emeli Powell. If you have any questions, please feel free to contact me.  I will see the patient again in 6 months and as needed    Much or all of the text in this note was generated through the use of Dragon Dictate voice-to-text software. Errors in spelling or words which seem out of context are unintentional.   Sound alike errors, in particular, may have escaped editing.       History of Present Illness:   It is my pleasure to see Emeli Powell at the [facility] for evaluation of syncope. Emeli Powell is a 38 year old female without significant past medical history except for mild overweight.    She is referred  to cardiology clinic For evaluation of her syncope.  She states that she had 3 episode of syncope during college.  She had another episode 5 years ago.  Last months she got COVID infection.  After 2 weeks of COVID infection, she had an episode of her syncope.  She described her syncope as started lightheaded, dizziness and then passed out for short time.  When she woke up, she was diaphoretic.  She was not confused after she woke up.  She denies chest pain, shortness of breath, palpitations prior to or post syncopal episode.  She had no seizure presentation.  She went to ER for evaluation on 2024.  Her ER evaluation was not significant.  ECG has no ischemic change, no conduction abnormality, no other abnormality such as Brugada.  Echocardiogram was reported normal heart function and structure.  She has no chronic medical conditions.    Past Medical History:     Patient Active Problem List   Diagnosis    Routine postpartum follow-up    NERIS III with severe dysplasia     (normal spontaneous vaginal delivery)    Pregnant    Encounter for triage in pregnant patient       Past Surgical History:   No past surgical history on file.    Family History:     Family History   Problem Relation Age of Onset    No Known Problems Mother     No Known Problems Father     Colon Cancer Sister     Diabetes Mother     Heart Disease Paternal Grandmother        Social History:    reports that she has never smoked. She has never used smokeless tobacco. She reports that she does not currently use alcohol. She reports that she does not use drugs.    Review of Systems:   12 systems are reviewed negative except for in HPI.    Meds:   No current outpatient medications on file.     Allergies:   Patient has no known allergies.    Objective:      Physical Exam  92.5 kg (204 lb)     Body mass index is 31.02 kg/m .  /75 (BP Location: Left arm, Patient Position: Sitting, Cuff Size: Adult Regular)   Pulse 70   Resp 16   Wt 92.5  kg (204 lb)   LMP  (LMP Unknown)   SpO2 97%   BMI 31.02 kg/m      General Appearance:   Awake, Alert, No acute distress.   HEENT:  Pupil equal, reactive to light. No scleral icterus; the mucous membranes were moist. No oral ulcers or thrush.    Neck: No cervical bruits. No JVD. No thyromegaly. No lymph node enlargement or tenderness.   Chest: The spine was straight. The chest was symmetric.   Lungs:   Respirations unlabored. Lungs are clear to auscultation. No crackles. No wheezing.   Cardiovascular:   RRR, normal first and second heart sounds with no murmurs. No rubs or gallops.    Abdomen:  Obese.  Soft. No tenderness. Non-distended. Bowels sounds are present   Extremities: Equal posterior tibial pulses. No leg edema.   Skin: No rashes or ulcers. Warm, Dry.   Musculoskeletal: No tenderness. No deformity.   Neurologic: Mood and affect are appropriate. No focal deficits.         EKG:  Personally reivewed  Sinus rhythm  No ischemic ST-T change  Mild interventricular conduction delay    Cardiac Imaging Studies  Echo on February 29, 2024:  1. The left ventricle is normal in size. There is normal left ventricular wall  thickness. Left ventricular systolic function is normal. The visual ejection  fraction is 55-60%. Diastolic Doppler findings (E/E' ratio and/or other  parameters) suggest left ventricular filling pressures are normal. No regional  wall motion abnormalities noted.  2. The right ventricle is normal size. The right ventricular systolic function is normal.  3. Trace mitral and tricuspid regurgitation.  4. No pericardial effusion.  5. No previous study for comparison.    Lab Review   Lab Results   Component Value Date     02/17/2024     09/21/2019    CO2 27 02/17/2024    CO2 29 04/13/2022    CO2 27 09/21/2019    BUN 14.5 02/17/2024    BUN 16 04/13/2022    BUN 15 09/21/2019     Lab Results   Component Value Date    WBC 8.2 02/17/2024    WBC 8.7 09/21/2019    HGB 13.8 02/17/2024    HGB 13.8  09/21/2019    HCT 40.7 02/17/2024    HCT 40.4 09/21/2019    MCV 89 02/17/2024    MCV 91 09/21/2019     02/17/2024     09/21/2019     Lab Results   Component Value Date    CHOL 246 02/10/2023    TRIG 105 02/10/2023    HDL 93 02/10/2023     02/10/2023     LDL Cholesterol Calculated   Date Value Ref Range Status   02/10/2023 132 (H) <=100 mg/dL Final     Lab Results   Component Value Date    TSH 1.67 02/17/2024    TSH 1.59 04/13/2022                   Thank you for allowing me to participate in the care of your patient.      Sincerely,     Kade Carter MD     [facility]  cc:   Jessica Silverman [place]  [id number] [place] [address]

## 2024-07-13 ENCOUNTER — HEALTH MAINTENANCE LETTER (OUTPATIENT)
Age: 39
End: 2024-07-13

## 2024-12-16 ENCOUNTER — OFFICE VISIT (OUTPATIENT)
Dept: FAMILY MEDICINE | Facility: CLINIC | Age: 39
End: 2024-12-16
Payer: COMMERCIAL

## 2024-12-16 VITALS
DIASTOLIC BLOOD PRESSURE: 60 MMHG | BODY MASS INDEX: 32.25 KG/M2 | TEMPERATURE: 98.1 F | WEIGHT: 205.5 LBS | HEART RATE: 87 BPM | SYSTOLIC BLOOD PRESSURE: 122 MMHG | RESPIRATION RATE: 16 BRPM | OXYGEN SATURATION: 99 % | HEIGHT: 67 IN

## 2024-12-16 DIAGNOSIS — Z12.4 CERVICAL CANCER SCREENING: ICD-10-CM

## 2024-12-16 DIAGNOSIS — Z30.432 ENCOUNTER FOR IUD REMOVAL: ICD-10-CM

## 2024-12-16 DIAGNOSIS — B07.0 PLANTAR WARTS: ICD-10-CM

## 2024-12-16 DIAGNOSIS — Z00.00 ROUTINE GENERAL MEDICAL EXAMINATION AT A HEALTH CARE FACILITY: Primary | ICD-10-CM

## 2024-12-16 PROCEDURE — G0145 SCR C/V CYTO,THINLAYER,RESCR: HCPCS | Performed by: FAMILY MEDICINE

## 2024-12-16 PROCEDURE — 87624 HPV HI-RISK TYP POOLED RSLT: CPT | Performed by: FAMILY MEDICINE

## 2024-12-16 PROCEDURE — 99459 PELVIC EXAMINATION: CPT | Performed by: FAMILY MEDICINE

## 2024-12-16 PROCEDURE — 17110 DESTRUCTION B9 LES UP TO 14: CPT | Performed by: FAMILY MEDICINE

## 2024-12-16 PROCEDURE — 99395 PREV VISIT EST AGE 18-39: CPT | Mod: 25 | Performed by: FAMILY MEDICINE

## 2024-12-16 PROCEDURE — 58301 REMOVE INTRAUTERINE DEVICE: CPT | Mod: 59 | Performed by: FAMILY MEDICINE

## 2024-12-16 SDOH — HEALTH STABILITY: PHYSICAL HEALTH: ON AVERAGE, HOW MANY DAYS PER WEEK DO YOU ENGAGE IN MODERATE TO STRENUOUS EXERCISE (LIKE A BRISK WALK)?: 3 DAYS

## 2024-12-16 ASSESSMENT — SOCIAL DETERMINANTS OF HEALTH (SDOH): HOW OFTEN DO YOU GET TOGETHER WITH FRIENDS OR RELATIVES?: ONCE A WEEK

## 2024-12-16 NOTE — PATIENT INSTRUCTIONS
After freezing, there may some redness of the skin or even blistering with some bleeding into the blister.  Wash gently as needed.  If blister occurs, when it pops, apply vaseline.    Usually will heal in 4-7 days.  Then may start home therapy.  Soak area in very warm water for 10-20 minutes  Dry roughly. May use nail file or pumice stone to get dead skin off.  Apply a salicylic acid patch or salicylic liquid over the area.  Cover with tape. Consider duct tape, particularly if on sole of foot.  Repeat daily.  If becomes quite inflammed and/or red, stop for several days.    Return in two weeks for repeat freezing if needed.      Patient Education   Preventive Care Advice   This is general advice given by our system to help you stay healthy. However, your care team may have specific advice just for you. Please talk to your care team about your preventive care needs.  Nutrition  Eat 5 or more servings of fruits and vegetables each day.  Try wheat bread, brown rice and whole grain pasta (instead of white bread, rice, and pasta).  Get enough calcium and vitamin D. Check the label on foods and aim for 100% of the RDA (recommended daily allowance).  Lifestyle  Exercise at least 150 minutes each week  (30 minutes a day, 5 days a week).  Do muscle strengthening activities 2 days a week. These help control your weight and prevent disease.  No smoking.  Wear sunscreen to prevent skin cancer.  Have a dental exam and cleaning every 6 months.  Yearly exams  See your health care team every year to talk about:  Any changes in your health.  Any medicines your care team has prescribed.  Preventive care, family planning, and ways to prevent chronic diseases.  Shots (vaccines)   HPV shots (up to age 26), if you've never had them before.  Hepatitis B shots (up to age 59), if you've never had them before.  COVID-19 shot: Get this shot when it's due.  Flu shot: Get a flu shot every year.  Tetanus shot: Get a tetanus shot every 10  years.  Pneumococcal, hepatitis A, and RSV shots: Ask your care team if you need these based on your risk.  Shingles shot (for age 50 and up)  General health tests  Diabetes screening:  Starting at age 35, Get screened for diabetes at least every 3 years.  If you are younger than age 35, ask your care team if you should be screened for diabetes.  Cholesterol test: At age 39, start having a cholesterol test every 5 years, or more often if advised.  Bone density scan (DEXA): At age 50, ask your care team if you should have this scan for osteoporosis (brittle bones).  Hepatitis C: Get tested at least once in your life.  STIs (sexually transmitted infections)  Before age 24: Ask your care team if you should be screened for STIs.  After age 24: Get screened for STIs if you're at risk. You are at risk for STIs (including HIV) if:  You are sexually active with more than one person.  You don't use condoms every time.  You or a partner was diagnosed with a sexually transmitted infection.  If you are at risk for HIV, ask about PrEP medicine to prevent HIV.  Get tested for HIV at least once in your life, whether you are at risk for HIV or not.  Cancer screening tests  Cervical cancer screening: If you have a cervix, begin getting regular cervical cancer screening tests starting at age 21.  Breast cancer scan (mammogram): If you've ever had breasts, begin having regular mammograms starting at age 40. This is a scan to check for breast cancer.  Colon cancer screening: It is important to start screening for colon cancer at age 45.  Have a colonoscopy test every 10 years (or more often if you're at risk) Or, ask your provider about stool tests like a FIT test every year or Cologuard test every 3 years.  To learn more about your testing options, visit:   .  For help making a decision, visit:   https://bit.ly/mg96714.  Prostate cancer screening test: If you have a prostate, ask your care team if a prostate cancer screening test  (PSA) at age 55 is right for you.  Lung cancer screening: If you are a current or former smoker ages 50 to 80, ask your care team if ongoing lung cancer screenings are right for you.  For informational purposes only. Not to replace the advice of your health care provider. Copyright   2023 [employer]. All rights reserved. Clinically reviewed by the [facility] Transitions Program. [employer] [id number] - REV 01/24.

## 2024-12-16 NOTE — PROGRESS NOTES
"Preventive Care Visit  [facility] ZEFERINO Silverman MD, Family Medicine  Dec 16, 2024      Assessment & Plan     Routine general medical examination at a health care facility    Cervical cancer screening  - HPV and Gynecologic Cytology Panel - Recommended Age 30 - 65 Years  - Gynecologic Cytology (PAP)    Plantar warts  See note below for procedure  - Treat Benign Wart or Mulloscum Contagiosum or Milia up to 14 lesions (No quantity required)    Encounter for IUD removal  See procedure note below  - REMOVE INTRAUTERINE DEVICE    Patient has been advised of split billing requirements and indicates understanding: Yes        BMI  Estimated body mass index is 31.88 kg/m  as calculated from the following:    Height as of this encounter: 1.71 m (5' 7.32\").    Weight as of this encounter: 93.2 kg (205 lb 8 oz).   Weight management plan: Discussed healthy diet and exercise guidelines    Counseling  Appropriate preventive services were addressed with this patient via screening, questionnaire, or discussion as appropriate for fall prevention, nutrition, physical activity, Tobacco-use cessation, social engagement, weight loss and cognition.  Checklist reviewing preventive services available has been given to the patient.  Reviewed patient's diet, addressing concerns and/or questions.   She is at risk for lack of exercise and has been provided with information to increase physical activity for the benefit of her well-being.           Nilsa Sainz is a 39 year old, presenting for the following:  Physical (Pap smear due- not fasting for labs today) and IUD Removal         DAVE Morgan is a pleasant 39-year-old female who presents to the clinic today for complete physical.  She is due for a repeat Pap smear.  She had HPV on her last Pap.  Smear and colposcopy was recommended but patient was postpartum and had a lot that was going on.    Now that it has been 1 year I recommended a repeat Pap smear. "  She would like to get her IUD removed today as she is hoping to become pregnant.    Otherwise, she has some warts on her right foot that she would like me to look at today.  They do become irritated and sometimes blister.        Health Care Directive  Patient does not have a Health Care Directive: Discussed advance care planning with patient; however, patient declined at this time.      12/16/2024   General Health   How would you rate your overall physical health? Good   Feel stress (tense, anxious, or unable to sleep) Only a little      (!) STRESS CONCERN      12/16/2024   Nutrition   Three or more servings of calcium each day? Yes   Diet: Regular (no restrictions)   How many servings of fruit and vegetables per day? (!) 2-3   How many sweetened beverages each day? 0-1            12/16/2024   Exercise   Days per week of moderate/strenous exercise 3 days            12/16/2024   Social Factors   Frequency of gathering with friends or relatives Once a week   Worry food won't last until get money to buy more No   Food not last or not have enough money for food? No   Do you have housing? (Housing is defined as stable permanent housing and does not include staying ouside in a car, in a tent, in an abandoned building, in an overnight shelter, or couch-surfing.) Yes   Are you worried about losing your housing? No   Lack of transportation? No   Unable to get utilities (heat,electricity)? No            12/16/2024   Dental   Dentist two times every year? Yes            12/16/2024   TB Screening   Were you born outside of the [place]? No              Today's PHQ-2 Score:       2/20/2024    10:29 AM   PHQ-2 ( 1999 [employer])   Q1: Little interest or pleasure in doing things 0    Q2: Feeling down, depressed or hopeless 0    PHQ-2 Score 0   Q1: Little interest or pleasure in doing things Not at all   Q2: Feeling down, depressed or hopeless Not at all   PHQ-2 Score 0       Patient-reported         12/16/2024   Substance Use   Alcohol  "more than 3/day or more than 7/wk No   Do you use any other substances recreationally? No        Social History     Tobacco Use    Smoking status: Never    Smokeless tobacco: Never   Vaping Use    Vaping status: Never Used   Substance Use Topics    Alcohol use: Not Currently    Drug use: Never          Mammogram Screening - Patient under 40 years of age: Routine Mammogram Screening not recommended.         12/16/2024   STI Screening   New sexual partner(s) since last STI/HIV test? No        History of abnormal Pap smear: YES - reflected in Problem List and Health Maintenance accordingly        Latest Ref Rng & Units 7/10/2023    10:59 AM 4/13/2022     1:59 PM 5/21/2021     2:30 PM   PAP / HPV   PAP  Negative for Intraepithelial Lesion or Malignancy (NILM)  Negative for Intraepithelial Lesion or Malignancy (NILM)     HPV 16 DNA Negative Negative  Negative  Negative    HPV 18 DNA Negative Negative  Negative  Negative    Other HR HPV Negative Positive  Negative  Negative            12/16/2024   Contraception/Family Planning   Questions about contraception or family planning No           Reviewed and updated as needed this visit by Provider                    Past Medical History:   Diagnosis Date    Cervical high risk HPV (human papillomavirus) test positive     2020     No past surgical history on file.      Review of Systems  Constitutional, HEENT, cardiovascular, pulmonary, GI, , musculoskeletal, neuro, skin, endocrine and psych systems are negative, except as otherwise noted.     Objective    Exam  /60   Pulse 87   Temp 98.1  F (36.7  C) (Tympanic)   Resp 16   Ht 1.71 m (5' 7.32\")   Wt 93.2 kg (205 lb 8 oz)   LMP  (LMP Unknown)   SpO2 99%   BMI 31.88 kg/m     Estimated body mass index is 31.88 kg/m  as calculated from the following:    Height as of this encounter: 1.71 m (5' 7.32\").    Weight as of this encounter: 93.2 kg (205 lb 8 oz).    Physical Exam    Physical Exam:  General Appearance: Alert, " cooperative, no distress, appears stated age   Head: Normocephalic, without obvious abnormality, atraumatic  Eyes: PERRL, conjunctiva/corneas clear, EOM's intact   Ears: Normal TM's and external ear canals, both ears  Nose:Nares normal, septum midline,mucosa normal, no drainage    Throat:Lips, mucosa, and tongue normal; teeth and gums normal  Neck: Supple, symmetrical, trachea midline, no adenopathy;  thyroid: not enlarged, symmetric, no tenderness/mass/nodules  Back: Symmetric, no curvature, ROM normal,  Lungs: Clear to auscultation bilaterally, respirations unlabored  Breasts: No breast masses, tenderness, asymmetry, or nipple discharge.  Heart: Regular rate and rhythm, S1 and S2 normal, no murmur, rub, or gallop  Abdomen: Soft, non-tender, bowel sounds active all four quadrants,  no masses, no organomegaly  Pelvic:normal external female genitalia, normal appearing vaginal mucosa and cervix  Extremities: Extremities normal, atraumatic, no cyanosis or edema  Skin: Skin color, texture, turgor normal, no rashes or lesions  Lymph nodes: Cervical, supraclavicular, and axillary nodes normal and   Neurologic: Normal      Procedure note for wart cryotherapy    After verbal consent was obtained by the patient the 4 wart(s) located on the patient's right posterior heel (2), plantar foot (2)  were cleansed with an alcohol swab, pared with a sterile 10 blade and subsequently were frozen with liquid nitrogen using two freeze thaw cycles of 10 seconds each.  Patient tolerated the procedure well with no immediate complication.  Aftercares were discussed.  The patient will return in 2-3 weeks for refreezing if needed.      IUD removal procedure note    Pre-operative Diagnosis: desires pregnancy    Post-operative Diagnosis: IUD removed    Indications: desires pregnancy    Procedure Details   The risks (including infection, bleeding, pain) and benefits of the procedure were explained to the patient and verbal informed consent was  obtained.    The patient was laid in the supine position.  A sterile speculum was inserted into the vaginal canal.  The IUD strings were easily visualized, grasped with a ring forceps, and the IUD was gently removed in its entirety.    Condition:  Stable    Complications:  None    Plan:    The patient was advised to call for any fever or for prolonged or severe pain or bleeding. She was advised to use NSAID as needed for mild to moderate pain.         Signed Electronically by: Jessica Silverman MD

## 2024-12-17 LAB
HPV HR 12 DNA CVX QL NAA+PROBE: NEGATIVE
HPV16 DNA CVX QL NAA+PROBE: NEGATIVE
HPV18 DNA CVX QL NAA+PROBE: NEGATIVE
HUMAN PAPILLOMA VIRUS FINAL DIAGNOSIS: NORMAL

## 2024-12-19 ENCOUNTER — PATIENT OUTREACH (OUTPATIENT)
Dept: FAMILY MEDICINE | Facility: CLINIC | Age: 39
End: 2024-12-19
Payer: COMMERCIAL

## 2024-12-19 LAB
BKR AP ASSOCIATED HPV REPORT: NORMAL
BKR LAB AP GYN ADEQUACY: NORMAL
BKR LAB AP GYN INTERPRETATION: NORMAL
BKR LAB AP PREVIOUS ABNL DX: NORMAL
BKR LAB AP PREVIOUS ABNORMAL: NORMAL
PATH REPORT.COMMENTS IMP SPEC: NORMAL
PATH REPORT.COMMENTS IMP SPEC: NORMAL
PATH REPORT.RELEVANT HX SPEC: NORMAL

## 2025-07-19 ENCOUNTER — HEALTH MAINTENANCE LETTER (OUTPATIENT)
Age: 40
End: 2025-07-19